# Patient Record
Sex: MALE | Race: WHITE | NOT HISPANIC OR LATINO | Employment: OTHER | URBAN - NONMETROPOLITAN AREA
[De-identification: names, ages, dates, MRNs, and addresses within clinical notes are randomized per-mention and may not be internally consistent; named-entity substitution may affect disease eponyms.]

---

## 2018-08-17 ENCOUNTER — OFFICE VISIT (OUTPATIENT)
Dept: CARDIOLOGY | Facility: CLINIC | Age: 58
End: 2018-08-17

## 2018-08-17 VITALS
WEIGHT: 206 LBS | DIASTOLIC BLOOD PRESSURE: 88 MMHG | OXYGEN SATURATION: 99 % | HEART RATE: 65 BPM | BODY MASS INDEX: 32.33 KG/M2 | SYSTOLIC BLOOD PRESSURE: 140 MMHG | HEIGHT: 67 IN

## 2018-08-17 DIAGNOSIS — I73.9 PVD (PERIPHERAL VASCULAR DISEASE) WITH CLAUDICATION (HCC): ICD-10-CM

## 2018-08-17 DIAGNOSIS — E78.00 PURE HYPERCHOLESTEROLEMIA: ICD-10-CM

## 2018-08-17 DIAGNOSIS — Z01.818 PRE-OPERATIVE CLEARANCE: ICD-10-CM

## 2018-08-17 DIAGNOSIS — F10.10 ETOH ABUSE: ICD-10-CM

## 2018-08-17 DIAGNOSIS — I25.709 CORONARY ARTERY DISEASE INVOLVING CORONARY BYPASS GRAFT OF NATIVE HEART WITH ANGINA PECTORIS (HCC): ICD-10-CM

## 2018-08-17 DIAGNOSIS — Z72.0 NICOTINE ABUSE: ICD-10-CM

## 2018-08-17 DIAGNOSIS — I10 ESSENTIAL HYPERTENSION: Primary | ICD-10-CM

## 2018-08-17 PROCEDURE — 93000 ELECTROCARDIOGRAM COMPLETE: CPT | Performed by: INTERNAL MEDICINE

## 2018-08-17 PROCEDURE — 99204 OFFICE O/P NEW MOD 45 MIN: CPT | Performed by: INTERNAL MEDICINE

## 2018-08-17 RX ORDER — LISINOPRIL 20 MG/1
20 TABLET ORAL NIGHTLY
COMMUNITY
Start: 2018-08-06 | End: 2022-05-09 | Stop reason: SDUPTHER

## 2018-08-17 RX ORDER — CETIRIZINE HYDROCHLORIDE 10 MG/1
10 TABLET ORAL AS NEEDED
COMMUNITY

## 2018-08-17 RX ORDER — METOPROLOL SUCCINATE 50 MG
50 TABLET, EXTENDED RELEASE 24 HR ORAL NIGHTLY
COMMUNITY
Start: 2018-08-06

## 2018-08-17 RX ORDER — ATORVASTATIN CALCIUM 20 MG/1
20 TABLET, FILM COATED ORAL NIGHTLY
COMMUNITY
Start: 2018-08-06 | End: 2022-05-09 | Stop reason: SDUPTHER

## 2018-08-17 RX ORDER — ASPIRIN 81 MG/1
81 TABLET, CHEWABLE ORAL DAILY
COMMUNITY

## 2018-08-17 NOTE — PROGRESS NOTES
[facility]  OFFICE NOTE    Nikhil Hernadez  57 y.o. male    08/17/2018  1. Essential hypertension    2. Pure hypercholesterolemia    3. Pre-operative clearance    4. Nicotine abuse    5. ETOH abuse    6. PVD (peripheral vascular disease) with claudication (CMS/HCC)    7. Coronary artery disease involving coronary bypass graft of native heart with angina pectoris (CMS/HCC)        Chief complaint -preop hip surgery    History of present Illness- 57-year-old gentleman was history of coronary disease status post two-vessel bypass in 1995 at [facility] in the setting of an acute MI has not been followed regularly since 2000.  He is coming for a preop hip surgery.  He smokes about a pack-a-day for many years, and he drinks pretty heavy and even smell of alcohol right now at 8:30 AM.  He has significant claudication of both legs.  He denies any GI symptoms.  He does get short of breath but doesn't complain of any angina pain.  He is on Toprol and lisinopril along with statins.  His labs are being followed by Dr. blair.        Allergies   Allergen Reactions   • Aleve [Naproxen Sodium] Itching, Swelling and Rash   • Morphine And Related Nausea And Vomiting         Past Medical History:   Diagnosis Date   • Gallstones    • Hyperlipidemia    • Hypertension    • Myocardial infarction          Past Surgical History:   Procedure Laterality Date   • ARTERIAL BYPASS SURGERY     • CORONARY ARTERY BYPASS GRAFT           Family History   Problem Relation Age of Onset   • Cancer Mother    • Cancer Father          Social History     Social History   • Marital status:      Spouse name: N/A   • Number of children: N/A   • Years of education: N/A     Occupational History   • Not on file.     Social History Main Topics   • Smoking status: Current Every Day Smoker     Packs/day: 1.00     Types: Cigarettes   • Smokeless tobacco: Never Used   • Alcohol use 9.0 oz/week     15 Shots of liquor per week  "  • Drug use: No   • Sexual activity: Defer     Other Topics Concern   • Not on file     Social History Narrative   • No narrative on file         Current Outpatient Prescriptions   Medication Sig Dispense Refill   • aspirin 81 MG chewable tablet Chew 81 mg Daily.     • atorvastatin (LIPITOR) 20 MG tablet Take 20 mg by mouth Every Night.     • cetirizine (zyrTEC) 10 MG tablet Take 10 mg by mouth Daily.     • lisinopril (PRINIVIL,ZESTRIL) 20 MG tablet Take 20 mg by mouth Daily.     • TOPROL XL 50 MG 24 hr tablet Take 50 mg by mouth Daily.       No current facility-administered medications for this visit.          Review of Systems     Constitution: Denies any fatigue, fever or chills    HENT: Denies any headache, hearing impairment,     Eyes: Denies any blurring of vision, or photophobia     Cardivascular - As per history of present illness     Respiratory system-  COPD,  shortness of breath     Endocrine:   history of hyperlipidemia,                       Musculoskeletal:  Significant arthritis of the hips    Gastrointestinal: No nausea, vomiting, or melena    Genitourinary: No dysuria or hematuria    Neurological:   No history of seizure disorder, stroke, memory problems    Psychiatric/Behavioral:        No history of depression    Hematological- no history of easy bruising or any bleeding diathesis            OBJECTIVE    /88   Pulse 65   Ht 170.2 cm (67\")   Wt 93.4 kg (206 lb)   SpO2 99%   BMI 32.26 kg/m²       Physical Exam     Constitutional: is oriented to person, place, and time.     Skin-warm and dry    Well developed and nourished in no acute distress      Head: Normocephalic and atraumatic.     Eyes: Pupils are equal    Neck: Neck supple. No bruit in the carotids    Cardiovascular: [place] in the fifth intercostal space   Regular rate, and  Rhythm,    S1 greater than S2, no S3 or S4, no gallop     Pulmonary/Chest:   Air  Entry is equal on both sides  Scattered rhonchi at the base on both sides    "   Abdominal: Soft.  No hepatosplenomegaly, bowel sounds are present    Musculoskeletal: No kyphoscoliosis, no significant thickening of the joints    Neurological: is alert and oriented to person, place, and time.    cranial nerve are intact .   No motor or sensory deficit    Extremities-no edema, no radial femoral delay      Psychiatric: He has a normal mood and affect.                  His behavior is normal.             ECG 12 Lead  Date/Time: 8/17/2018 8:35 AM  Performed by: LEVAR JACKSON  Authorized by: LEVAR JACKSON   Comparison: not compared with previous ECG   Rhythm: sinus rhythm  Clinical impression: non-specific ECG  Comments: Sinus rhythm with Q-wave in V1 and V2 possible old septal MI with nonspecific ST-T changes CAD status post CABG              A/P    CAD status post CABG with 2 vessel bypass in 1995 for preop hip surgery with long-standing history of tobacco use and alcohol abuse will do a Lexiscan nuclear stress test rule out ischemia.  Will get an echo to assess LV function and valvular function as he drinks significantly    Peripheral vascular disease with significant claudication will get a EDGARDO as he is going for hip surgery.    Hypertension/hyperlipidemia on atorvastatin and lisinopril with Toprol-XL.    Tobacco abuse/EtOH abuse-needs risk factor modification.    Follow-up in 6-8 weeks            This document has been electronically signed by Levar Jackson MD on August 17, 2018 8:31 AM       EMR Dragon/Transcription disclaimer:   Some of this note may be an electronic transcription/translation of spoken language to printed text. The electronic translation of spoken language may permit erroneous, or at times, nonsensical words or phrases to be inadvertently transcribed; Although I have reviewed the note for such errors, some may still exist.

## 2018-08-27 ENCOUNTER — HOSPITAL ENCOUNTER (OUTPATIENT)
Dept: NUCLEAR MEDICINE | Facility: HOSPITAL | Age: 58
Discharge: HOME | End: 2018-08-27

## 2018-08-27 ENCOUNTER — HOSPITAL ENCOUNTER (OUTPATIENT)
Dept: CARDIOLOGY | Facility: HOSPITAL | Age: 58
Discharge: HOME | End: 2018-08-27

## 2018-08-27 LAB
BH CV STRESS BP STAGE 1: NORMAL
BH CV STRESS COMMENTS STAGE 1: NORMAL
BH CV STRESS DOSE REGADENOSON STAGE 1: 0.4
BH CV STRESS DURATION MIN STAGE 1: 0
BH CV STRESS DURATION SEC STAGE 1: 10
BH CV STRESS HR STAGE 1: 50
BH CV STRESS PROTOCOL 1: NORMAL
BH CV STRESS RECOVERY BP: NORMAL MMHG
BH CV STRESS RECOVERY HR: 68 BPM
BH CV STRESS STAGE 1: 1
LV EF NUC BP: 55 %
MAXIMAL PREDICTED HEART RATE: 161 BPM
PERCENT MAX PREDICTED HR: 51.55 %
STRESS BASELINE BP: NORMAL MMHG
STRESS BASELINE HR: 51 BPM
STRESS PERCENT HR: 61 %
STRESS POST ESTIMATED WORKLOAD: 1 METS
STRESS POST PEAK BP: NORMAL MMHG
STRESS POST PEAK HR: 83 BPM
STRESS TARGET HR: 137 BPM

## 2018-08-27 PROCEDURE — A9500 TC99M SESTAMIBI: HCPCS | Performed by: INTERNAL MEDICINE

## 2018-08-27 PROCEDURE — 78452 HT MUSCLE IMAGE SPECT MULT: CPT

## 2018-08-27 PROCEDURE — 0 TECHNETIUM SESTAMIBI: Performed by: INTERNAL MEDICINE

## 2018-08-27 PROCEDURE — 93017 CV STRESS TEST TRACING ONLY: CPT

## 2018-08-27 PROCEDURE — 93016 CV STRESS TEST SUPVJ ONLY: CPT | Performed by: INTERNAL MEDICINE

## 2018-08-27 PROCEDURE — 93018 CV STRESS TEST I&R ONLY: CPT | Performed by: INTERNAL MEDICINE

## 2018-08-27 PROCEDURE — 25010000002 REGADENOSON 0.4 MG/5ML SOLUTION: Performed by: INTERNAL MEDICINE

## 2018-08-27 PROCEDURE — 78452 HT MUSCLE IMAGE SPECT MULT: CPT | Performed by: INTERNAL MEDICINE

## 2018-08-27 RX ORDER — 0.9 % SODIUM CHLORIDE 0.9 %
10 VIAL (ML) INJECTION AS NEEDED
Status: DISCONTINUED | OUTPATIENT
Start: 2018-08-27 | End: 2018-08-28 | Stop reason: HOSPADM

## 2018-08-27 RX ADMIN — REGADENOSON 0.4 MG: 0.08 INJECTION, SOLUTION INTRAVENOUS at 08:41

## 2018-08-27 RX ADMIN — TECHNETIUM TC 99M SESTAMIBI 1 DOSE: 1 INJECTION INTRAVENOUS at 07:07

## 2018-08-27 RX ADMIN — SODIUM CHLORIDE, PRESERVATIVE FREE 10 ML: 5 INJECTION INTRAVENOUS at 08:41

## 2018-08-27 RX ADMIN — TECHNETIUM TC 99M SESTAMIBI 1 DOSE: 1 INJECTION INTRAVENOUS at 08:42

## 2018-09-05 ENCOUNTER — DOCUMENTATION (OUTPATIENT)
Dept: CARDIOLOGY | Facility: CLINIC | Age: 58
End: 2018-09-05

## 2018-09-06 ENCOUNTER — PREP FOR SURGERY (OUTPATIENT)
Dept: OTHER | Facility: HOSPITAL | Age: 58
End: 2018-09-06

## 2018-09-06 DIAGNOSIS — I73.9 PVD (PERIPHERAL VASCULAR DISEASE) WITH CLAUDICATION (HCC): Primary | ICD-10-CM

## 2018-09-06 DIAGNOSIS — R68.89 ABNORMAL ANKLE BRACHIAL INDEX (ABI): Primary | ICD-10-CM

## 2018-09-06 LAB
BH CV ECHO MEAS - ACS: 1.9 CM
BH CV ECHO MEAS - AO MAX PG (FULL): 0.11 MMHG
BH CV ECHO MEAS - AO MAX PG: 7.8 MMHG
BH CV ECHO MEAS - AO MEAN PG (FULL): 1 MMHG
BH CV ECHO MEAS - AO MEAN PG: 4 MMHG
BH CV ECHO MEAS - AO ROOT AREA (BSA CORRECTED): 1.6
BH CV ECHO MEAS - AO ROOT AREA: 8 CM^2
BH CV ECHO MEAS - AO ROOT DIAM: 3.2 CM
BH CV ECHO MEAS - AO V2 MAX: 140 CM/SEC
BH CV ECHO MEAS - AO V2 MEAN: 96 CM/SEC
BH CV ECHO MEAS - AO V2 VTI: 30.3 CM
BH CV ECHO MEAS - ASC AORTA: 3.3 CM
BH CV ECHO MEAS - AVA(I,A): 4.2 CM^2
BH CV ECHO MEAS - AVA(I,D): 4.2 CM^2
BH CV ECHO MEAS - AVA(V,A): 4.5 CM^2
BH CV ECHO MEAS - AVA(V,D): 4.5 CM^2
BH CV ECHO MEAS - BSA(HAYCOCK): 2.1 M^2
BH CV ECHO MEAS - BSA: 2 M^2
BH CV ECHO MEAS - BZI_BMI: 32.3 KILOGRAMS/M^2
BH CV ECHO MEAS - BZI_METRIC_HEIGHT: 170.2 CM
BH CV ECHO MEAS - BZI_METRIC_WEIGHT: 93.4 KG
BH CV ECHO MEAS - EDV(CUBED): 175.6 ML
BH CV ECHO MEAS - EDV(MOD-SP4): 149 ML
BH CV ECHO MEAS - EDV(TEICH): 153.7 ML
BH CV ECHO MEAS - EF(CUBED): 44.6 %
BH CV ECHO MEAS - EF(MOD-SP4): 48.3 %
BH CV ECHO MEAS - EF(TEICH): 36.7 %
BH CV ECHO MEAS - ESV(CUBED): 97.3 ML
BH CV ECHO MEAS - ESV(MOD-SP4): 77 ML
BH CV ECHO MEAS - ESV(TEICH): 97.3 ML
BH CV ECHO MEAS - FS: 17.9 %
BH CV ECHO MEAS - IVS/LVPW: 0.91
BH CV ECHO MEAS - IVSD: 1 CM
BH CV ECHO MEAS - LA DIMENSION: 4.5 CM
BH CV ECHO MEAS - LA/AO: 1.4
BH CV ECHO MEAS - LV DIASTOLIC VOL/BSA (35-75): 72.8 ML/M^2
BH CV ECHO MEAS - LV MASS(C)D: 234.3 GRAMS
BH CV ECHO MEAS - LV MASS(C)DI: 114.4 GRAMS/M^2
BH CV ECHO MEAS - LV MAX PG: 7.7 MMHG
BH CV ECHO MEAS - LV MEAN PG: 3 MMHG
BH CV ECHO MEAS - LV SYSTOLIC VOL/BSA (12-30): 37.6 ML/M^2
BH CV ECHO MEAS - LV V1 MAX: 139 CM/SEC
BH CV ECHO MEAS - LV V1 MEAN: 85 CM/SEC
BH CV ECHO MEAS - LV V1 VTI: 28.3 CM
BH CV ECHO MEAS - LVIDD: 5.6 CM
BH CV ECHO MEAS - LVIDS: 4.6 CM
BH CV ECHO MEAS - LVLD AP4: 8.9 CM
BH CV ECHO MEAS - LVLS AP4: 7.3 CM
BH CV ECHO MEAS - LVOT AREA (M): 4.5 CM^2
BH CV ECHO MEAS - LVOT AREA: 4.5 CM^2
BH CV ECHO MEAS - LVOT DIAM: 2.4 CM
BH CV ECHO MEAS - LVPWD: 1.1 CM
BH CV ECHO MEAS - MV A MAX VEL: 44.6 CM/SEC
BH CV ECHO MEAS - MV DEC SLOPE: 436 CM/SEC^2
BH CV ECHO MEAS - MV E MAX VEL: 61 CM/SEC
BH CV ECHO MEAS - MV E/A: 1.4
BH CV ECHO MEAS - MV P1/2T MAX VEL: 94.1 CM/SEC
BH CV ECHO MEAS - MV P1/2T: 63.2 MSEC
BH CV ECHO MEAS - MVA P1/2T LCG: 2.3 CM^2
BH CV ECHO MEAS - MVA(P1/2T): 3.5 CM^2
BH CV ECHO MEAS - PA MAX PG: 1.2 MMHG
BH CV ECHO MEAS - PA V2 MAX: 53.8 CM/SEC
BH CV ECHO MEAS - RAP SYSTOLE: 5 MMHG
BH CV ECHO MEAS - RVDD: 3.4 CM
BH CV ECHO MEAS - RVSP: 16.6 MMHG
BH CV ECHO MEAS - SI(AO): 119 ML/M^2
BH CV ECHO MEAS - SI(CUBED): 38.2 ML/M^2
BH CV ECHO MEAS - SI(LVOT): 62.5 ML/M^2
BH CV ECHO MEAS - SI(MOD-SP4): 35.2 ML/M^2
BH CV ECHO MEAS - SI(TEICH): 27.5 ML/M^2
BH CV ECHO MEAS - SV(AO): 243.7 ML
BH CV ECHO MEAS - SV(CUBED): 78.3 ML
BH CV ECHO MEAS - SV(LVOT): 128 ML
BH CV ECHO MEAS - SV(MOD-SP4): 72 ML
BH CV ECHO MEAS - SV(TEICH): 56.3 ML
BH CV ECHO MEAS - TR MAX VEL: 170 CM/SEC
BH CV LOWER ARTERIAL LEFT ABI RATIO: 0.62
BH CV LOWER ARTERIAL LEFT DORSALIS PEDIS SYS MAX: 106 MMHG
BH CV LOWER ARTERIAL LEFT LOW THIGH SYS MAX: 121 MMHG
BH CV LOWER ARTERIAL LEFT POPLITEAL SYS MAX: 123 MMHG
BH CV LOWER ARTERIAL LEFT POST TIBIAL SYS MAX: 106 MMHG
BH CV LOWER ARTERIAL RIGHT ABI RATIO: 1.14
BH CV LOWER ARTERIAL RIGHT DORSALIS PEDIS SYS MAX: 195 MMHG
BH CV LOWER ARTERIAL RIGHT LOW THIGH SYS MAX: 200 MMHG
BH CV LOWER ARTERIAL RIGHT POPLITEAL SYS MAX: 255 MMHG
BH CV LOWER ARTERIAL RIGHT POST TIBIAL SYS MAX: 188 MMHG
LV EF 2D ECHO EST: 51 %
MAXIMAL PREDICTED HEART RATE: 161 BPM
STRESS TARGET HR: 137 BPM
UPPER ARTERIAL LEFT ARM BRACHIAL SYS MAX: 165 MMHG
UPPER ARTERIAL RIGHT ARM BRACHIAL SYS MAX: 171 MMHG

## 2018-09-07 DIAGNOSIS — Z79.899 ON LONG TERM DRUG THERAPY: Primary | ICD-10-CM

## 2018-09-11 ENCOUNTER — LAB (OUTPATIENT)
Dept: LAB | Facility: HOSPITAL | Age: 58
End: 2018-09-11

## 2018-09-11 DIAGNOSIS — Z79.899 ON LONG TERM DRUG THERAPY: ICD-10-CM

## 2018-09-11 LAB
ALBUMIN SERPL-MCNC: 3.9 G/DL (ref 3.4–4.8)
ALBUMIN/GLOB SERPL: 1.2 G/DL (ref 1.1–1.8)
ALP SERPL-CCNC: 66 U/L (ref 38–126)
ALT SERPL W P-5'-P-CCNC: 62 U/L (ref 21–72)
ANION GAP SERPL CALCULATED.3IONS-SCNC: 9 MMOL/L (ref 5–15)
AST SERPL-CCNC: 52 U/L (ref 17–59)
BILIRUB SERPL-MCNC: 0.7 MG/DL (ref 0.2–1.3)
BUN BLD-MCNC: 9 MG/DL (ref 7–21)
BUN/CREAT SERPL: 13.8 (ref 7–25)
CALCIUM SPEC-SCNC: 9.1 MG/DL (ref 8.4–10.2)
CHLORIDE SERPL-SCNC: 103 MMOL/L (ref 95–110)
CO2 SERPL-SCNC: 25 MMOL/L (ref 22–31)
CREAT BLD-MCNC: 0.65 MG/DL (ref 0.7–1.3)
GFR SERPL CREATININE-BSD FRML MDRD: 126 ML/MIN/1.73 (ref 56–130)
GLOBULIN UR ELPH-MCNC: 3.3 GM/DL (ref 2.3–3.5)
GLUCOSE BLD-MCNC: 110 MG/DL (ref 60–100)
POTASSIUM BLD-SCNC: 4.2 MMOL/L (ref 3.5–5.1)
PROT SERPL-MCNC: 7.2 G/DL (ref 6.3–8.6)
SODIUM BLD-SCNC: 137 MMOL/L (ref 137–145)

## 2018-09-11 PROCEDURE — 80053 COMPREHEN METABOLIC PANEL: CPT

## 2018-09-14 ENCOUNTER — HOSPITAL ENCOUNTER (OUTPATIENT)
Dept: CT IMAGING | Facility: HOSPITAL | Age: 58
Discharge: HOME | End: 2018-09-14
Admitting: INTERNAL MEDICINE

## 2018-09-14 DIAGNOSIS — R68.89 ABNORMAL ANKLE BRACHIAL INDEX (ABI): ICD-10-CM

## 2018-09-14 PROCEDURE — 75635 CT ANGIO ABDOMINAL ARTERIES: CPT

## 2018-09-14 PROCEDURE — 0 IOPAMIDOL PER 1 ML: Performed by: INTERNAL MEDICINE

## 2018-09-14 RX ADMIN — IOPAMIDOL 94 ML: 755 INJECTION, SOLUTION INTRAVENOUS at 15:23

## 2018-09-18 ENCOUNTER — DOCUMENTATION (OUTPATIENT)
Dept: CARDIOLOGY | Facility: CLINIC | Age: 58
End: 2018-09-18

## 2018-09-18 RX ORDER — SODIUM CHLORIDE 0.9 % (FLUSH) 0.9 %
1-10 SYRINGE (ML) INJECTION AS NEEDED
Status: CANCELLED | OUTPATIENT
Start: 2018-10-02

## 2018-09-18 RX ORDER — SODIUM CHLORIDE 9 MG/ML
80 INJECTION, SOLUTION INTRAVENOUS CONTINUOUS
Status: CANCELLED | OUTPATIENT
Start: 2018-10-02

## 2018-09-19 PROBLEM — I73.9 PVD (PERIPHERAL VASCULAR DISEASE) WITH CLAUDICATION (HCC): Status: ACTIVE | Noted: 2018-09-19

## 2018-10-02 ENCOUNTER — HOSPITAL ENCOUNTER (OUTPATIENT)
Facility: HOSPITAL | Age: 58
Discharge: HOME | End: 2018-10-03
Attending: INTERNAL MEDICINE | Admitting: INTERNAL MEDICINE

## 2018-10-02 DIAGNOSIS — I73.9 PVD (PERIPHERAL VASCULAR DISEASE) WITH CLAUDICATION (HCC): ICD-10-CM

## 2018-10-02 LAB
ANION GAP SERPL CALCULATED.3IONS-SCNC: 11 MMOL/L (ref 5–15)
BUN BLD-MCNC: 6 MG/DL (ref 7–21)
BUN/CREAT SERPL: 9.7 (ref 7–25)
CALCIUM SPEC-SCNC: 8.7 MG/DL (ref 8.4–10.2)
CHLORIDE SERPL-SCNC: 105 MMOL/L (ref 95–110)
CO2 SERPL-SCNC: 24 MMOL/L (ref 22–31)
CREAT BLD-MCNC: 0.62 MG/DL (ref 0.7–1.3)
DEPRECATED RDW RBC AUTO: 45.2 FL (ref 35.1–43.9)
ERYTHROCYTE [DISTWIDTH] IN BLOOD BY AUTOMATED COUNT: 13.6 % (ref 11.5–14.5)
GFR SERPL CREATININE-BSD FRML MDRD: 133 ML/MIN/1.73 (ref 56–130)
GLUCOSE BLD-MCNC: 114 MG/DL (ref 60–100)
HCT VFR BLD AUTO: 43 % (ref 39–49)
HGB BLD-MCNC: 14.8 G/DL (ref 13.7–17.3)
INR PPP: 1.04 (ref 0.8–1.2)
MCH RBC QN AUTO: 31.1 PG (ref 26.5–34)
MCHC RBC AUTO-ENTMCNC: 34.4 G/DL (ref 31.5–36.3)
MCV RBC AUTO: 90.3 FL (ref 80–98)
PLATELET # BLD AUTO: 163 10*3/MM3 (ref 150–450)
PMV BLD AUTO: 10.8 FL (ref 8–12)
POTASSIUM BLD-SCNC: 3.7 MMOL/L (ref 3.5–5.1)
PROTHROMBIN TIME: 13.4 SECONDS (ref 11.1–15.3)
RBC # BLD AUTO: 4.76 10*6/MM3 (ref 4.37–5.74)
SODIUM BLD-SCNC: 140 MMOL/L (ref 137–145)
WBC NRBC COR # BLD: 5.28 10*3/MM3 (ref 3.2–9.8)

## 2018-10-02 PROCEDURE — C1894 INTRO/SHEATH, NON-LASER: HCPCS | Performed by: INTERNAL MEDICINE

## 2018-10-02 PROCEDURE — C1769 GUIDE WIRE: HCPCS | Performed by: INTERNAL MEDICINE

## 2018-10-02 PROCEDURE — C1887 CATHETER, GUIDING: HCPCS | Performed by: INTERNAL MEDICINE

## 2018-10-02 PROCEDURE — 25010000002 HEPARIN (PORCINE) PER 1000 UNITS: Performed by: INTERNAL MEDICINE

## 2018-10-02 PROCEDURE — C1724 CATH, TRANS ATHEREC,ROTATION: HCPCS | Performed by: INTERNAL MEDICINE

## 2018-10-02 PROCEDURE — 37225 PR REVSC OPN/PRQ FEM/POP W/ATHRC/ANGIOP SM VSL: CPT | Performed by: INTERNAL MEDICINE

## 2018-10-02 PROCEDURE — 80048 BASIC METABOLIC PNL TOTAL CA: CPT | Performed by: INTERNAL MEDICINE

## 2018-10-02 PROCEDURE — 85027 COMPLETE CBC AUTOMATED: CPT | Performed by: INTERNAL MEDICINE

## 2018-10-02 PROCEDURE — C1725 CATH, TRANSLUMIN NON-LASER: HCPCS | Performed by: INTERNAL MEDICINE

## 2018-10-02 PROCEDURE — C2623 CATH, TRANSLUMIN, DRUG-COAT: HCPCS | Performed by: INTERNAL MEDICINE

## 2018-10-02 PROCEDURE — 0 IOPAMIDOL PER 1 ML: Performed by: INTERNAL MEDICINE

## 2018-10-02 PROCEDURE — 94760 N-INVAS EAR/PLS OXIMETRY 1: CPT

## 2018-10-02 PROCEDURE — 94799 UNLISTED PULMONARY SVC/PX: CPT

## 2018-10-02 PROCEDURE — 85610 PROTHROMBIN TIME: CPT | Performed by: INTERNAL MEDICINE

## 2018-10-02 PROCEDURE — 75716 ARTERY X-RAYS ARMS/LEGS: CPT | Performed by: INTERNAL MEDICINE

## 2018-10-02 PROCEDURE — 25010000002 MIDAZOLAM PER 1 MG: Performed by: INTERNAL MEDICINE

## 2018-10-02 PROCEDURE — 25010000002 PROTAMINE SULFATE PER 10 MG: Performed by: INTERNAL MEDICINE

## 2018-10-02 PROCEDURE — 25010000002 FENTANYL CITRATE (PF) 100 MCG/2ML SOLUTION: Performed by: INTERNAL MEDICINE

## 2018-10-02 PROCEDURE — 25010000002 ONDANSETRON PER 1 MG: Performed by: INTERNAL MEDICINE

## 2018-10-02 RX ORDER — ASPIRIN 81 MG/1
81 TABLET, CHEWABLE ORAL DAILY
Status: DISCONTINUED | OUTPATIENT
Start: 2018-10-02 | End: 2018-10-03 | Stop reason: HOSPADM

## 2018-10-02 RX ORDER — SODIUM CHLORIDE 9 MG/ML
80 INJECTION, SOLUTION INTRAVENOUS CONTINUOUS
Status: DISCONTINUED | OUTPATIENT
Start: 2018-10-02 | End: 2018-10-02

## 2018-10-02 RX ORDER — HEPARIN SODIUM 1000 [USP'U]/ML
INJECTION, SOLUTION INTRAVENOUS; SUBCUTANEOUS AS NEEDED
Status: DISCONTINUED | OUTPATIENT
Start: 2018-10-02 | End: 2018-10-02 | Stop reason: HOSPADM

## 2018-10-02 RX ORDER — ASPIRIN 81 MG/1
81 TABLET, CHEWABLE ORAL DAILY
Status: DISCONTINUED | OUTPATIENT
Start: 2018-10-02 | End: 2018-10-02

## 2018-10-02 RX ORDER — HYDROCODONE BITARTRATE AND ACETAMINOPHEN 10; 325 MG/1; MG/1
1 TABLET ORAL ONCE AS NEEDED
Status: COMPLETED | OUTPATIENT
Start: 2018-10-02 | End: 2018-10-02

## 2018-10-02 RX ORDER — PROTAMINE SULFATE 10 MG/ML
INJECTION, SOLUTION INTRAVENOUS AS NEEDED
Status: DISCONTINUED | OUTPATIENT
Start: 2018-10-02 | End: 2018-10-02 | Stop reason: HOSPADM

## 2018-10-02 RX ORDER — LISINOPRIL 20 MG/1
20 TABLET ORAL NIGHTLY
Status: DISCONTINUED | OUTPATIENT
Start: 2018-10-02 | End: 2018-10-03 | Stop reason: HOSPADM

## 2018-10-02 RX ORDER — ONDANSETRON 2 MG/ML
4 INJECTION INTRAMUSCULAR; INTRAVENOUS EVERY 6 HOURS PRN
Status: DISCONTINUED | OUTPATIENT
Start: 2018-10-02 | End: 2018-10-03 | Stop reason: HOSPADM

## 2018-10-02 RX ORDER — ONDANSETRON 4 MG/1
4 TABLET, FILM COATED ORAL EVERY 6 HOURS PRN
Status: DISCONTINUED | OUTPATIENT
Start: 2018-10-02 | End: 2018-10-03 | Stop reason: HOSPADM

## 2018-10-02 RX ORDER — CLOPIDOGREL BISULFATE 75 MG/1
75 TABLET ORAL DAILY
Status: DISCONTINUED | OUTPATIENT
Start: 2018-10-03 | End: 2018-10-03 | Stop reason: HOSPADM

## 2018-10-02 RX ORDER — METOPROLOL SUCCINATE 50 MG/1
50 TABLET, EXTENDED RELEASE ORAL NIGHTLY
Status: DISCONTINUED | OUTPATIENT
Start: 2018-10-02 | End: 2018-10-03 | Stop reason: HOSPADM

## 2018-10-02 RX ORDER — LORAZEPAM 0.5 MG/1
1 TABLET ORAL EVERY 6 HOURS
Status: DISCONTINUED | OUTPATIENT
Start: 2018-10-02 | End: 2018-10-03 | Stop reason: HOSPADM

## 2018-10-02 RX ORDER — ONDANSETRON 2 MG/ML
4 INJECTION INTRAMUSCULAR; INTRAVENOUS EVERY 6 HOURS PRN
Status: DISCONTINUED | OUTPATIENT
Start: 2018-10-02 | End: 2018-10-02 | Stop reason: SDUPTHER

## 2018-10-02 RX ORDER — SODIUM CHLORIDE 0.9 % (FLUSH) 0.9 %
1-10 SYRINGE (ML) INJECTION AS NEEDED
Status: DISCONTINUED | OUTPATIENT
Start: 2018-10-02 | End: 2018-10-02 | Stop reason: HOSPADM

## 2018-10-02 RX ORDER — CLOPIDOGREL BISULFATE 75 MG/1
TABLET ORAL AS NEEDED
Status: DISCONTINUED | OUTPATIENT
Start: 2018-10-02 | End: 2018-10-02 | Stop reason: HOSPADM

## 2018-10-02 RX ORDER — MIDAZOLAM HYDROCHLORIDE 1 MG/ML
INJECTION INTRAMUSCULAR; INTRAVENOUS AS NEEDED
Status: DISCONTINUED | OUTPATIENT
Start: 2018-10-02 | End: 2018-10-02 | Stop reason: HOSPADM

## 2018-10-02 RX ORDER — LIDOCAINE HYDROCHLORIDE 20 MG/ML
INJECTION, SOLUTION INFILTRATION; PERINEURAL AS NEEDED
Status: DISCONTINUED | OUTPATIENT
Start: 2018-10-02 | End: 2018-10-02 | Stop reason: HOSPADM

## 2018-10-02 RX ORDER — ATORVASTATIN CALCIUM 20 MG/1
20 TABLET, FILM COATED ORAL NIGHTLY
Status: DISCONTINUED | OUTPATIENT
Start: 2018-10-02 | End: 2018-10-03 | Stop reason: HOSPADM

## 2018-10-02 RX ORDER — FENTANYL CITRATE 50 UG/ML
INJECTION, SOLUTION INTRAMUSCULAR; INTRAVENOUS AS NEEDED
Status: DISCONTINUED | OUTPATIENT
Start: 2018-10-02 | End: 2018-10-02 | Stop reason: HOSPADM

## 2018-10-02 RX ORDER — SODIUM CHLORIDE 9 MG/ML
100 INJECTION, SOLUTION INTRAVENOUS CONTINUOUS
Status: DISCONTINUED | OUTPATIENT
Start: 2018-10-02 | End: 2018-10-03 | Stop reason: HOSPADM

## 2018-10-02 RX ORDER — ONDANSETRON 4 MG/1
4 TABLET, ORALLY DISINTEGRATING ORAL EVERY 6 HOURS PRN
Status: DISCONTINUED | OUTPATIENT
Start: 2018-10-02 | End: 2018-10-03 | Stop reason: HOSPADM

## 2018-10-02 RX ORDER — LORAZEPAM 0.5 MG/1
1 TABLET ORAL EVERY 8 HOURS
Status: DISCONTINUED | OUTPATIENT
Start: 2018-10-03 | End: 2018-10-03 | Stop reason: HOSPADM

## 2018-10-02 RX ADMIN — METOPROLOL SUCCINATE 50 MG: 50 TABLET, EXTENDED RELEASE ORAL at 20:25

## 2018-10-02 RX ADMIN — HYDROCODONE BITARTRATE AND ACETAMINOPHEN 1 TABLET: 10; 325 TABLET ORAL at 12:48

## 2018-10-02 RX ADMIN — ATORVASTATIN CALCIUM 20 MG: 20 TABLET, FILM COATED ORAL at 20:26

## 2018-10-02 RX ADMIN — LORAZEPAM 1 MG: 0.5 TABLET ORAL at 21:01

## 2018-10-02 RX ADMIN — SODIUM CHLORIDE 100 ML/HR: 9 INJECTION, SOLUTION INTRAVENOUS at 11:50

## 2018-10-02 RX ADMIN — SODIUM CHLORIDE 80 ML/HR: 9 INJECTION, SOLUTION INTRAVENOUS at 06:53

## 2018-10-02 RX ADMIN — LISINOPRIL 20 MG: 20 TABLET ORAL at 20:26

## 2018-10-02 RX ADMIN — ONDANSETRON HYDROCHLORIDE 4 MG: 2 INJECTION INTRAMUSCULAR; INTRAVENOUS at 12:48

## 2018-10-02 RX ADMIN — ASPIRIN 81 MG CHEWABLE TABLET 81 MG: 81 TABLET CHEWABLE at 12:48

## 2018-10-02 NOTE — PLAN OF CARE
Problem: Patient Care Overview  Goal: Plan of Care Review  Outcome: Ongoing (interventions implemented as appropriate)   10/02/18 7090   Coping/Psychosocial   Plan of Care Reviewed With patient   Plan of Care Review   Progress improving   OTHER   Outcome Summary no groin site complications/vss/ pain better after bedrest completed     Goal: Individualization and Mutuality  Outcome: Ongoing (interventions implemented as appropriate)    Goal: Discharge Needs Assessment  Outcome: Ongoing (interventions implemented as appropriate)      Problem: Pain, Acute (Adult)  Goal: Identify Related Risk Factors and Signs and Symptoms  Outcome: Ongoing (interventions implemented as appropriate)    Goal: Acceptable Pain Control/Comfort Level  Outcome: Ongoing (interventions implemented as appropriate)      Problem: Cardiac Catheterization (Diagnostic/Interventional) (Adult)  Goal: Signs and Symptoms of Listed Potential Problems Will be Absent, Minimized or Managed (Cardiac Catheterization)  Outcome: Ongoing (interventions implemented as appropriate)    Goal: Anesthesia/Sedation Recovery  Outcome: Outcome(s) achieved Date Met: 10/02/18      Problem: Cardiac: ACS (Acute Coronary Syndrome) (Adult)  Goal: Signs and Symptoms of Listed Potential Problems Will be Absent, Minimized or Managed (Cardiac: ACS)  Outcome: Ongoing (interventions implemented as appropriate)

## 2018-10-02 NOTE — H&P
[facility]  HISTORY AND PHYSICAL  Nikhil Hernadez  57 y.o. male    Chief complaint -significant claudication of left leg      History of present Lwisrqx-31-wy[age]-old gentleman with history of coronary disease status post two-vessel bypass in 1995 and [place] has been a heavy smoker and drinks significant alcohol has been having claudication pain on the left leg with difficulty getting around and had an EDGARDO which showed significant abnormality on the left side and subsequent CTA showed 2 high-grade stenosis on the left SFA and was brought for elective PTA to the left leg.              Allergies   Allergen Reactions   • Aleve [Naproxen Sodium] Itching, Swelling and Rash   • Morphine And Related Nausea And Vomiting         Past Medical History:   Diagnosis Date   • Gallstones    • Hyperlipidemia    • Hypertension    • Myocardial infarction (CMS/HCC)          Past Surgical History:   Procedure Laterality Date   • ARTERIAL BYPASS SURGERY     • CORONARY ARTERY BYPASS GRAFT           Family History   Problem Relation Age of Onset   • Cancer Mother    • Cancer Father          Social History     Social History   • Marital status:      Spouse name: N/A   • Number of children: N/A   • Years of education: N/A     Occupational History   • Not on file.     Social History Main Topics   • Smoking status: Current Every Day Smoker     Packs/day: 1.00     Types: Cigarettes   • Smokeless tobacco: Never Used   • Alcohol use 9.0 oz/week     15 Shots of liquor per week   • Drug use: No   • Sexual activity: Defer     Other Topics Concern   • Not on file     Social History Narrative   • No narrative on file         Current Facility-Administered Medications   Medication Dose Route Frequency Provider Last Rate Last Dose   • sodium chloride 0.9 % flush 1-10 mL  1-10 mL Intravenous PRN Dave Garza MD       • sodium chloride 0.9 % infusion  80 mL/hr Intravenous Continuous Dave Garza MD 80  "mL/hr at 10/02/18 0653 80 mL/hr at 10/02/18 0653         Review of Systems     Constitution: Denies any fatigue, fever or chills    HENT: Denies any headache, hearing impairment    Eyes: Denies any blurring of vision, or photophobia     Cardivascular - As per history of present illness     Respiratory system-denies any COPD, shortness of breath     Endocrine:   history of hyperlipidemia       Musculoskeletal: Significant claudication pain on the left leg    Gastrointestinal: No nausea, vomiting, or melena    Genitourinary: No dysuria or hematuria    Neurological:   No history of seizure disorder, stroke, memory problems    Psychiatric/Behavioral:      EtOH abuse and tobacco abuse    Hematological- no history of easy bruising            OBJECTIVE    /86 (BP Location: Right arm, Patient Position: Lying)   Pulse 66   Temp 98.3 °F (36.8 °C) (Temporal Artery )   Resp 20   Ht 170.2 cm (67\")   Wt 97.6 kg (215 lb 2.7 oz)   SpO2 100%   BMI 33.70 kg/m²       Physical Exam     Constitutional: is oriented to person, place, and time.     Skin-warm and dry    Well developed and nourished in no acute distress      Head: Normocephalic and atraumatic.     Eyes: Pupils are equal    Neck: Neck supple. No bruit in the carotids,    Cardiovascular: [place] in the fifth intercostal space, regular rate, and  Rhythm,  S1 greater than S2, no S3 or S4, no gallop       Pulmonary/Chest:   Air  Entry is equal on both sides  No wheezing or crackles,      Abdominal: Soft.  No hepatosplenomegaly, bowel sounds are present    Musculoskeletal: No kyphoscoliosis    Neurological: is alert and oriented to person, place, and time.    cranial nerve are intact .   No motor or sensory deficit    Extremities-no edema, pulses weak on the left popliteal and below      Psychiatric: He has a normal mood and affect.                  His behavior is normal.        Lab Results (last 24 hours)     Procedure Component Value Units Date/Time    Basic Metabolic " Panel [[id number]]  (Abnormal) Collected:  10/02/18 0649    Specimen:  Blood Updated:  10/02/18 0711     Glucose 114 (H) mg/dL      BUN 6 (L) mg/dL      Creatinine 0.62 (L) mg/dL      Sodium 140 mmol/L      Potassium 3.7 mmol/L      Chloride 105 mmol/L      CO2 24.0 mmol/L      Calcium 8.7 mg/dL      eGFR Non African Amer 133 (H) mL/min/1.73      BUN/Creatinine Ratio 9.7     Anion Gap 11.0 mmol/L     CBC (No Diff) [[id number]]  (Abnormal) Collected:  10/02/18 0649    Specimen:  Blood Updated:  10/02/18 0704     WBC 5.28 10*3/mm3      RBC 4.76 10*6/mm3      Hemoglobin 14.8 g/dL      Hematocrit 43.0 %      MCV 90.3 fL      MCH 31.1 pg      MCHC 34.4 g/dL      RDW 13.6 %      RDW-SD 45.2 (H) fl      MPV 10.8 fL      Platelets 163 10*3/mm3     Protime-INR [[id number]]  (Normal) Collected:  10/02/18 0649    Specimen:  Blood Updated:  10/02/18 0709     Protime 13.4 Seconds      INR 1.04    Narrative:       Therapeutic range for most indications is 2.0-3.0 INR,  or 2.5-3.5 for mechanical heart valves.                 A/P  Abnormal EDGARDO with abnormal CTA of the left SFA, for possible PTA with atherectomy and stent placement if necessary.  Expel all the risks and benefits not limited to amputation of the leg, bleeding, emergency surgery and patient consented.  Patient is ASA class II, Mallampatti level II.  He consented for conscious sedation    Hyperlipidemia will continue statins.  Hypertension on lisinopril    EtOH abuse and tobacco abuse needs his factor modification        Dave Garza MD  10/2/2018  8:04 AM    EMR Dragon/Transcription disclaimer:   Some of this note may be an electronic transcription/translation of spoken language to printed text. The electronic translation of spoken language may permit erroneous, or at times, nonsensical words or phrases to be inadvertently transcribed; Although I have reviewed the note for such errors, some may still exist.

## 2018-10-03 VITALS
HEIGHT: 67 IN | RESPIRATION RATE: 18 BRPM | BODY MASS INDEX: 33.97 KG/M2 | HEART RATE: 60 BPM | WEIGHT: 216.4 LBS | TEMPERATURE: 97.6 F | SYSTOLIC BLOOD PRESSURE: 159 MMHG | OXYGEN SATURATION: 94 % | DIASTOLIC BLOOD PRESSURE: 74 MMHG

## 2018-10-03 LAB
ANION GAP SERPL CALCULATED.3IONS-SCNC: 7 MMOL/L (ref 5–15)
ARTICHOKE IGE QN: 42 MG/DL (ref 1–129)
BUN BLD-MCNC: 8 MG/DL (ref 7–21)
BUN/CREAT SERPL: 11.4 (ref 7–25)
CALCIUM SPEC-SCNC: 8.2 MG/DL (ref 8.4–10.2)
CHLORIDE SERPL-SCNC: 105 MMOL/L (ref 95–110)
CHOLEST SERPL-MCNC: 102 MG/DL (ref 0–199)
CO2 SERPL-SCNC: 25 MMOL/L (ref 22–31)
CREAT BLD-MCNC: 0.7 MG/DL (ref 0.7–1.3)
DEPRECATED RDW RBC AUTO: 45.9 FL (ref 35.1–43.9)
ERYTHROCYTE [DISTWIDTH] IN BLOOD BY AUTOMATED COUNT: 13.8 % (ref 11.5–14.5)
GFR SERPL CREATININE-BSD FRML MDRD: 115 ML/MIN/1.73 (ref 56–130)
GLUCOSE BLD-MCNC: 94 MG/DL (ref 60–100)
HCT VFR BLD AUTO: 38.8 % (ref 39–49)
HDLC SERPL-MCNC: 38 MG/DL (ref 60–200)
HGB BLD-MCNC: 13.3 G/DL (ref 13.7–17.3)
LDLC/HDLC SERPL: 1.37 {RATIO} (ref 0–3.55)
MCH RBC QN AUTO: 31.3 PG (ref 26.5–34)
MCHC RBC AUTO-ENTMCNC: 34.3 G/DL (ref 31.5–36.3)
MCV RBC AUTO: 91.3 FL (ref 80–98)
PLATELET # BLD AUTO: 116 10*3/MM3 (ref 150–450)
PMV BLD AUTO: 9.8 FL (ref 8–12)
POTASSIUM BLD-SCNC: 3.9 MMOL/L (ref 3.5–5.1)
RBC # BLD AUTO: 4.25 10*6/MM3 (ref 4.37–5.74)
SODIUM BLD-SCNC: 137 MMOL/L (ref 137–145)
TRIGL SERPL-MCNC: 59 MG/DL (ref 20–199)
WBC NRBC COR # BLD: 4.99 10*3/MM3 (ref 3.2–9.8)

## 2018-10-03 PROCEDURE — 85027 COMPLETE CBC AUTOMATED: CPT | Performed by: INTERNAL MEDICINE

## 2018-10-03 PROCEDURE — 80048 BASIC METABOLIC PNL TOTAL CA: CPT | Performed by: INTERNAL MEDICINE

## 2018-10-03 PROCEDURE — 80061 LIPID PANEL: CPT | Performed by: INTERNAL MEDICINE

## 2018-10-03 PROCEDURE — 99212 OFFICE O/P EST SF 10 MIN: CPT | Performed by: INTERNAL MEDICINE

## 2018-10-03 RX ORDER — AMLODIPINE BESYLATE 5 MG/1
5 TABLET ORAL
Qty: 30 TABLET | Refills: 6 | Status: SHIPPED | OUTPATIENT
Start: 2018-10-03 | End: 2018-12-11 | Stop reason: SDUPTHER

## 2018-10-03 RX ORDER — AMLODIPINE BESYLATE 5 MG/1
5 TABLET ORAL
Status: DISCONTINUED | OUTPATIENT
Start: 2018-10-03 | End: 2018-10-03 | Stop reason: HOSPADM

## 2018-10-03 RX ORDER — CLOPIDOGREL BISULFATE 75 MG/1
75 TABLET ORAL DAILY
Qty: 30 TABLET | Refills: 6 | Status: SHIPPED | OUTPATIENT
Start: 2018-10-04 | End: 2018-12-11 | Stop reason: SDUPTHER

## 2018-10-03 RX ADMIN — CLOPIDOGREL BISULFATE 75 MG: 75 TABLET ORAL at 08:21

## 2018-10-03 RX ADMIN — AMLODIPINE BESYLATE 5 MG: 5 TABLET ORAL at 09:21

## 2018-10-03 RX ADMIN — ASPIRIN 81 MG CHEWABLE TABLET 81 MG: 81 TABLET CHEWABLE at 08:21

## 2018-10-03 NOTE — PLAN OF CARE
Problem: Patient Care Overview  Goal: Plan of Care Review  Outcome: Ongoing (interventions implemented as appropriate)   10/03/18 0602   Coping/Psychosocial   Plan of Care Reviewed With patient   Plan of Care Review   Progress no change   OTHER   Outcome Summary groin site soft to touch and distal pulse palpable. vss. pt being watched for etoch withdrawal . pt has had tremors and was placed on ciwa for the night     Goal: Individualization and Mutuality  Outcome: Ongoing (interventions implemented as appropriate)      Problem: Pain, Acute (Adult)  Goal: Identify Related Risk Factors and Signs and Symptoms  Outcome: Ongoing (interventions implemented as appropriate)    Goal: Acceptable Pain Control/Comfort Level  Outcome: Ongoing (interventions implemented as appropriate)      Problem: Cardiac Catheterization (Diagnostic/Interventional) (Adult)  Goal: Signs and Symptoms of Listed Potential Problems Will be Absent, Minimized or Managed (Cardiac Catheterization)  Outcome: Ongoing (interventions implemented as appropriate)      Problem: Cardiac: ACS (Acute Coronary Syndrome) (Adult)  Goal: Signs and Symptoms of Listed Potential Problems Will be Absent, Minimized or Managed (Cardiac: ACS)  Outcome: Outcome(s) achieved Date Met: 10/03/18

## 2018-10-03 NOTE — DISCHARGE SUMMARY
[facility]  INPATIENT DISCHARGE SUMMARY    Date of Discharge:  10/3/2018    Discharge Diagnosis: Peripheral vascular disease with severe claudication  Hypertension  CAD        Hospital Course  Patient is a 57 y.o. male presented with claudication pain and was brought for elective PTA to the left SFA and he did well.  He had rotational atherectomy and drug-coated balloon angioplasty    Procedures Performed  Procedure(s):  Abdominal Aortogram , orbital elliptical atherectomy of the left SFA, balloon angioplasty with a drug-coated balloon       Consults:   Consults     No orders found for last 30 day(s).          Pertinent Test Results:     Lab Results (last 24 hours)     Procedure Component Value Units Date/Time    Lipid Panel [[id number]]  (Abnormal) Collected:  10/03/18 0551    Specimen:  Blood Updated:  10/03/18 0636     Total Cholesterol 102 mg/dL      Triglycerides 59 mg/dL      HDL Cholesterol 38 (L) mg/dL      LDL Cholesterol  42 mg/dL      LDL/HDL Ratio 1.37    Basic Metabolic Panel [[id number]]  (Abnormal) Collected:  10/03/18 0551    Specimen:  Blood Updated:  10/03/18 0625     Glucose 94 mg/dL      BUN 8 mg/dL      Creatinine 0.70 mg/dL      Sodium 137 mmol/L      Potassium 3.9 mmol/L      Chloride 105 mmol/L      CO2 25.0 mmol/L      Calcium 8.2 (L) mg/dL      eGFR Non African Amer 115 mL/min/1.73      BUN/Creatinine Ratio 11.4     Anion Gap 7.0 mmol/L     CBC (No Diff) [[id number]]  (Abnormal) Collected:  10/03/18 0551    Specimen:  Blood Updated:  10/03/18 0624     WBC 4.99 10*3/mm3      RBC 4.25 (L) 10*6/mm3      Hemoglobin 13.3 (L) g/dL      Hematocrit 38.8 (L) %      MCV 91.3 fL      MCH 31.3 pg      MCHC 34.3 g/dL      RDW 13.8 %      RDW-SD 45.9 (H) fl      MPV 9.8 fL      Platelets 116 (L) 10*3/mm3      Comment: Specimen reanalyzed to confirm platelet count                 Condition on Discharge:  stable    Vital Signs  Temp:  [96.3 °F (35.7 °C)-98 °F (36.7 °C)] 97.7 °F  (36.5 °C)  Heart Rate:  [51-71] 63  Resp:  [18-20] 18  BP: (146-176)/(76-97) 176/87    Discharge Disposition  Home or Self Care    Discharge Medications     Discharge Medications      New Medications      Instructions Start Date   amLODIPine 5 MG tablet  Commonly known as:  NORVASC   5 mg, Oral, Every 24 Hours Scheduled      clopidogrel 75 MG tablet  Commonly known as:  PLAVIX   75 mg, Oral, Daily         Continue These Medications      Instructions Start Date   aspirin 81 MG chewable tablet   81 mg, Oral, Daily      atorvastatin 20 MG tablet  Commonly known as:  LIPITOR   20 mg, Oral, Nightly      cetirizine 10 MG tablet  Commonly known as:  zyrTEC   10 mg, Oral, Daily      lisinopril 20 MG tablet  Commonly known as:  PRINIVIL,ZESTRIL   20 mg, Oral, Nightly      TOPROL XL 50 MG 24 hr tablet  Generic drug:  metoprolol succinate XL   50 mg, Oral, Nightly             Discharge Diet:  cardiac diet    Activity at Discharge: as tolerated    Follow-up Appointments  Future Appointments  Date Time Provider Department Center   10/18/2018 8:00 AM Dave Garza MD [facility] None         Test Results Pending at Discharge        Dave Garza MD    EMR Dragon/Transcription disclaimer:   Some of this note may be an electronic transcription/translation of spoken language to printed text. The electronic translation of spoken language may permit erroneous, or at times, nonsensical words or phrases to be inadvertently transcribed; Although I have reviewed the note for such errors, some may still exist.

## 2018-10-18 ENCOUNTER — OFFICE VISIT (OUTPATIENT)
Dept: CARDIOLOGY | Facility: CLINIC | Age: 58
End: 2018-10-18

## 2018-10-18 VITALS
WEIGHT: 216 LBS | HEART RATE: 71 BPM | SYSTOLIC BLOOD PRESSURE: 140 MMHG | DIASTOLIC BLOOD PRESSURE: 90 MMHG | HEIGHT: 67 IN | BODY MASS INDEX: 33.9 KG/M2

## 2018-10-18 DIAGNOSIS — Z72.0 NICOTINE ABUSE: ICD-10-CM

## 2018-10-18 DIAGNOSIS — E78.00 PURE HYPERCHOLESTEROLEMIA: ICD-10-CM

## 2018-10-18 DIAGNOSIS — I73.9 PVD (PERIPHERAL VASCULAR DISEASE) WITH CLAUDICATION (HCC): ICD-10-CM

## 2018-10-18 DIAGNOSIS — I10 ESSENTIAL HYPERTENSION: Primary | ICD-10-CM

## 2018-10-18 DIAGNOSIS — F10.10 ETOH ABUSE: ICD-10-CM

## 2018-10-18 PROCEDURE — 99213 OFFICE O/P EST LOW 20 MIN: CPT | Performed by: INTERNAL MEDICINE

## 2018-10-18 NOTE — PROGRESS NOTES
"  [facility]  OFFICE NOTE    Nikhil Hernadez  57 y.o. male    10/18/2018  1. Essential hypertension    2. Pure hypercholesterolemia    3. PVD (peripheral vascular disease) with claudication (CMS/HCC)    4. Nicotine abuse    5. ETOH abuse        Chief complaint -preop hip surgery/ status post PTA to the left SFA    History of present Illness- 57-year-old gentleman was history of coronary disease status post two-vessel bypass in 1995 at [facility] in the setting of an acute MI has not been followed regularly since 2000.  He is coming for a preop hip surgery.  He smokes about a pack-a-day for many years, and he drinks pretty heavy and even smell of alcohol right now at 8:30 AM.  He had total occlusion of the left SFA which was treated with \"atherectomy and drug-coated balloon.  He is on Plavix and aspirin.  Prior to his hip surgery he can stop the Plavix and aspirin 5 days prior to the surgery.  He can proceed with moderate risk by ACC /AHA guidelines      Allergies   Allergen Reactions   • Aleve [Naproxen Sodium] Itching, Swelling and Rash   • Morphine And Related Nausea And Vomiting         Past Medical History:   Diagnosis Date   • Gallstones    • Hyperlipidemia    • Hypertension    • Myocardial infarction (CMS/HCC)          Past Surgical History:   Procedure Laterality Date   • ARTERIAL BYPASS SURGERY     • CORONARY ARTERY BYPASS GRAFT     • INTERVENTIONAL RADIOLOGY PROCEDURE N/A 10/2/2018    Procedure: Abdominal Aortogram;  Surgeon: Dave Garza MD;  Location: [facility] INVASIVE LOCATION;  Service: Cardiology         Family History   Problem Relation Age of Onset   • Cancer Mother    • Cancer Father          Social History     Social History   • Marital status:      Spouse name: N/A   • Number of children: N/A   • Years of education: N/A     Occupational History   • Not on file.     Social History Main Topics   • Smoking status: Current Every Day Smoker     " "Packs/day: 1.00     Types: Cigarettes   • Smokeless tobacco: Never Used   • Alcohol use 9.0 oz/week     15 Shots of liquor per week   • Drug use: No   • Sexual activity: Defer     Other Topics Concern   • Not on file     Social History Narrative   • No narrative on file         Current Outpatient Prescriptions   Medication Sig Dispense Refill   • amLODIPine (NORVASC) 5 MG tablet Take 1 tablet by mouth Daily. 30 tablet 6   • aspirin 81 MG chewable tablet Chew 81 mg Daily.     • atorvastatin (LIPITOR) 20 MG tablet Take 20 mg by mouth Every Night.     • cetirizine (zyrTEC) 10 MG tablet Take 10 mg by mouth Daily.     • clopidogrel (PLAVIX) 75 MG tablet Take 1 tablet by mouth Daily. 30 tablet 6   • lisinopril (PRINIVIL,ZESTRIL) 20 MG tablet Take 20 mg by mouth Every Night.     • TOPROL XL 50 MG 24 hr tablet Take 50 mg by mouth Every Night.       No current facility-administered medications for this visit.          Review of Systems     Constitution: Denies any fatigue, fever or chills    HENT: Denies any headache, hearing impairment,     Eyes: Denies any blurring of vision, or photophobia     Cardivascular - As per history of present illness     Respiratory system-  COPD,  shortness of breath     Endocrine:   history of hyperlipidemia,                       Musculoskeletal:  Significant arthritis of the hips    Gastrointestinal: No nausea, vomiting, or melena    Genitourinary: No dysuria or hematuria    Neurological:   No history of seizure disorder, stroke, memory problems    Psychiatric/Behavioral:        No history of depression    Hematological- no history of easy bruising or any bleeding diathesis            OBJECTIVE    /90   Pulse 71   Ht 170.2 cm (67.01\")   Wt 98 kg (216 lb)   BMI 33.82 kg/m²       Physical Exam     Constitutional: is oriented to person, place, and time.     Skin-warm and dry    Well developed and nourished in no acute distress      Head: Normocephalic and atraumatic.     Eyes: Pupils " are equal    Neck: Neck supple. No bruit in the carotids    Cardiovascular: [place] in the fifth intercostal space   Regular rate, and  Rhythm,    S1 greater than S2, no S3 or S4, no gallop     Pulmonary/Chest:   Air  Entry is equal on both sides  Scattered rhonchi at the base on both sides      Abdominal: Soft.  No hepatosplenomegaly, bowel sounds are present    Musculoskeletal: No kyphoscoliosis, no significant thickening of the joints    Neurological: is alert and oriented to person, place, and time.    cranial nerve are intact .   No motor or sensory deficit    Extremities-no edema, no radial femoral delay      Psychiatric: He has a normal mood and affect.                  His behavior is normal.           Procedures      A/P    CAD status post CABG with 2 vessel bypass in 1995 for preop hip surgery with long-standing history of tobacco use and alcohol abuse.  Stress test showed  intermediate risk study and no chest pain.  Can proceed with surgery with moderate risk    Peripheral vascular disease -talus post PTA to the left SFA and will continue aspirin and Plavix.  We'll repeat another EDGARDO in 3 months    Hypertension/hyperlipidemia on atorvastatin and lisinopril with Toprol-XL.    Tobacco abuse/EtOH abuse-needs risk factor modification.    Follow-up in 6  months            This document has been electronically signed by Dave Garza MD on October 18, 2018 8:16 AM       EMR Dragon/Transcription disclaimer:   Some of this note may be an electronic transcription/translation of spoken language to printed text. The electronic translation of spoken language may permit erroneous, or at times, nonsensical words or phrases to be inadvertently transcribed; Although I have reviewed the note for such errors, some may still exist.

## 2018-11-29 ENCOUNTER — TRANSCRIBE ORDERS (OUTPATIENT)
Dept: PHYSICAL THERAPY | Facility: HOSPITAL | Age: 58
End: 2018-11-29

## 2018-11-29 DIAGNOSIS — M25.551 RIGHT HIP PAIN: ICD-10-CM

## 2018-11-29 DIAGNOSIS — Z96.641 HISTORY OF RIGHT HIP REPLACEMENT: Primary | ICD-10-CM

## 2018-12-06 ENCOUNTER — HOSPITAL ENCOUNTER (OUTPATIENT)
Dept: PHYSICAL THERAPY | Facility: HOSPITAL | Age: 58
Setting detail: THERAPIES SERIES
Discharge: HOME | End: 2018-12-06

## 2018-12-06 DIAGNOSIS — Z96.641 STATUS POST TOTAL REPLACEMENT OF RIGHT HIP: Primary | ICD-10-CM

## 2018-12-06 PROCEDURE — 97110 THERAPEUTIC EXERCISES: CPT | Performed by: PHYSICAL THERAPIST

## 2018-12-06 PROCEDURE — 97162 PT EVAL MOD COMPLEX 30 MIN: CPT | Performed by: PHYSICAL THERAPIST

## 2018-12-11 ENCOUNTER — HOSPITAL ENCOUNTER (OUTPATIENT)
Dept: PHYSICAL THERAPY | Facility: HOSPITAL | Age: 58
Setting detail: THERAPIES SERIES
Discharge: HOME | End: 2018-12-11

## 2018-12-11 DIAGNOSIS — Z96.641 STATUS POST TOTAL REPLACEMENT OF RIGHT HIP: Primary | ICD-10-CM

## 2018-12-11 PROCEDURE — 97110 THERAPEUTIC EXERCISES: CPT

## 2018-12-11 RX ORDER — CLOPIDOGREL BISULFATE 75 MG/1
75 TABLET ORAL DAILY
Qty: 90 TABLET | Refills: 2 | Status: SHIPPED | OUTPATIENT
Start: 2018-12-11 | End: 2018-12-14 | Stop reason: SDUPTHER

## 2018-12-11 RX ORDER — AMLODIPINE BESYLATE 5 MG/1
5 TABLET ORAL
Qty: 90 TABLET | Refills: 2 | Status: SHIPPED | OUTPATIENT
Start: 2018-12-11 | End: 2018-12-14 | Stop reason: SDUPTHER

## 2018-12-11 NOTE — THERAPY TREATMENT NOTE
Outpatient Physical Therapy Ortho Treatment Note  [facility]  Maryann Riojas PTA       Patient Name: Nikhil Hernadez  : 1960  MRN: [id number]  Today's Date: 2018      Visit Date: 2018     Visits: 2/2  Insurance Visits Approved: 60 visits  Recert Due: 2018  MD Appt: 2018  Pain: pretreatment 10; post treatment 7/10  Improvement: pt is subjectively reporting 0% improvement since initial evaluation    Visit Dx:    ICD-10-CM ICD-9-CM   1. Status post total replacement of right hip Z96.641 V43.64       Patient Active Problem List   Diagnosis   • Essential hypertension   • Pure hypercholesterolemia   • Nicotine abuse   • Pre-operative clearance   • ETOH abuse   • PVD (peripheral vascular disease) with claudication (CMS/HCC)        Past Medical History:   Diagnosis Date   • Gallstones    • Hyperlipidemia    • Hypertension    • Myocardial infarction (CMS/HCC)         Past Surgical History:   Procedure Laterality Date   • ARTERIAL BYPASS SURGERY     • CORONARY ARTERY BYPASS GRAFT     • TOTAL HIP ARTHROPLASTY Right 2018       PT Ortho     Row Name 18 0800       Subjective Comments    Subjective Comments  states that he has had a lot of swelling since starting physical therapy. reports so much so that he went yesterday back to his doctor at [facility] and had a doppler/US done to rule out blood clots. states that it was negative and they didn't have any indication as to why he was swelling.   -       Precautions and Contraindications    Precautions/Limitations  hip precautions- right  -      User Key  (r) = Recorded By, (t) = Taken By, (c) = Cosigned By    Initials Name Provider Type     Maryann Riojas PTA Physical Therapy Assistant                      PT Assessment/Plan     Row Name 18 0900          PT Assessment    Assessment Comments  patient did well with all therex completed today. does have visible edema in R LE  -        PT Plan     PT Frequency  2x/week  -     PT Plan Comments  standing hip 3 way  -       User Key  (r) = Recorded By, (t) = Taken By, (c) = Cosigned By    Initials Name Provider Type     MoisemaryMaryann PTA Physical Therapy Assistant          Modalities     Row Name 12/11/18 0800             Ice    Ice Applied  Yes  -      Location  Right Anterior and lateral hip semireclined  -      Rx Minutes  15 mins  -      Ice S/P Rx  Yes  -        User Key  (r) = Recorded By, (t) = Taken By, (c) = Cosigned By    Initials Name Provider Type     MoisemaryMaryann PTA Physical Therapy Assistant          Exercises     Row Name 12/11/18 0800             Subjective Comments    Subjective Comments  states that he has had a lot of swelling since starting physical therapy. reports so much so that he went yesterday back to his doctor at [facility] and had a doppler/US done to rule out blood clots. states that it was negative and they didn't have any indication as to why he was swelling.   -         Subjective Pain    Able to rate subjective pain?  yes  -      Pre-Treatment Pain Level  7  -      Post-Treatment Pain Level  -- can't really tell a differance.   -         Exercise 1    Exercise Name 1  Pro II LE's  -      Time 1  10 minutes  -      Additional Comments  L 1.0  -         Exercise 2    Exercise Name 2  sit to/from stand  -      Sets 2  2  -      Reps 2  10  -MH         Exercise 3    Exercise Name 3  St. Heel Raises  -      Reps 3  20  -MH         Exercise 4    Exercise Name 4  Bridges  -      Reps 4  20  -      Time 4  5 sec hold  -         Exercise 5    Exercise Name 5  Heel Slides  -      Time 5  4 minutes  -         Exercise 6    Exercise Name 6  Hooklying Marching  -      Time 6  4 minutes  -         Exercise 7    Exercise Name 7  Supine SAQ  -      Reps 7  20  -      Time 7  5 sec hold  -        User Key  (r) = Recorded By, (t) = Taken By, (c) = Cosigned By    Initials Name Provider Type     Maryann Suh PTA Physical Therapy Assistant                         PT OP Goals     Row Name 12/11/18 0900          PT Short Term Goals    STG Date to Achieve  12/27/18  -     STG 1  I with HEP and have additions/changes by next recertification.  -     STG 1 Progress  Ongoing  -     STG 2  Patient able ot verbalize all 3 hip precautins with no cueing.  -     STG 2 Progress  Ongoing  -     STG 3  Patient able ot ambualte with more heel to toe gait with RW >= 150'.  -     STG 3 Progress  Ongoing  -     STG 4  Patient able to perform standing alternating marching with R hip flexion >= 80°.  -     STG 4 Progress  Ongoing  -     STG 5  Patient able to eprform 20 SLR, no lag present.  -     STG 5 Progress  Ongoing  [facility]        Long Term Goals    LTG Date to Achieve  01/18/19  -     LTG 1  AROm ro R hip all WFL, ext >= 10°.  -     LTG 1 Progress  Ongoing  -     LTG 2  B LE 5/5  -     LTG 2 Progress  Ongoing  -     LTG 3  Patient able ot perform sit to/from stand x20 with 1 UE A.  -     LTG 3 Progress  Ongoing  -     LTG 4  Patient able to ambulate up/down 3 steps reciprocally x5 reps HRA for balance.  -     LTG 4 Progress  Ongoing  -     LTG 5  Patient able to ambulate >= 600' non-antalgically no increas ein pain.  -     LTG 5 Progress  Ongoing  [facility]     LTG 6  I with final HEP.  -     LTG 6 Progress  Ongoing  [facility]     LTG 7  D/C with a final HEp and free 30 day fitness formula mememberip.  -     LTG 7 Progress  Ongoing  [facility]        Time Calculation    PT Goal Re-Cert Due Date  12/27/18  -       User Key  (r) = Recorded By, (t) = Taken By, (c) = Cosigned By    Initials Name Provider Type    Maryann Suh PTA Physical Therapy Assistant          Therapy Education  Given: HEP, Symptoms/condition management, Pain management, Mobility training, Edema management, Bandaging/dressing change, Fall prevention and home safety  Program: Reinforced  How Provided: Verbal,  Demonstration  Provided to: Patient  Level of Understanding: Verbalized, Demonstrated              Time Calculation:   Start Time: 0852  Stop Time: 0948  Time Calculation (min): 56 min  PT Non-Billable Time (min): 15 min  Total Timed Code Minutes- PT: 41 minute(s)    Therapy Charges for Today     Code Description Service Date Service Provider Modifiers Qty    66947971900 HC PT THER PROC EA 15 MIN 12/11/2018 Maryann Riojas PTA GP 3    82092806308 HC PT THER SUPP EA 15 MIN 12/11/2018 Maryann Riojas PTA GP 1                    Maryann Riojas PTA  12/11/2018

## 2018-12-13 ENCOUNTER — HOSPITAL ENCOUNTER (OUTPATIENT)
Dept: PHYSICAL THERAPY | Facility: HOSPITAL | Age: 58
Setting detail: THERAPIES SERIES
Discharge: HOME | End: 2018-12-13

## 2018-12-13 DIAGNOSIS — Z96.641 STATUS POST TOTAL REPLACEMENT OF RIGHT HIP: Primary | ICD-10-CM

## 2018-12-13 PROCEDURE — 97110 THERAPEUTIC EXERCISES: CPT

## 2018-12-13 NOTE — THERAPY TREATMENT NOTE
"    Outpatient Physical Therapy Ortho Treatment Note  [facility]  Maryann Riojas PTA       Patient Name: Nikhil Hernadez  : 1960  MRN: [id number]  Today's Date: 2018      Visit Date: 2018     Visits: 3/3  Insurance Visits Approved: 60 visits  Recert Due: 2018  MD Appt: 2018  Pain: pretreatment 310; post treatment \"same as when I came in\"/10  Improvement: pt is subjectively reporting 0% improvement since initial evaluation    Visit Dx:    ICD-10-CM ICD-9-CM   1. Status post total replacement of right hip Z96.641 V43.64       Patient Active Problem List   Diagnosis   • Essential hypertension   • Pure hypercholesterolemia   • Nicotine abuse   • Pre-operative clearance   • ETOH abuse   • PVD (peripheral vascular disease) with claudication (CMS/HCC)        Past Medical History:   Diagnosis Date   • Gallstones    • Hyperlipidemia    • Hypertension    • Myocardial infarction (CMS/HCC)         Past Surgical History:   Procedure Laterality Date   • ARTERIAL BYPASS SURGERY     • CORONARY ARTERY BYPASS GRAFT     • TOTAL HIP ARTHROPLASTY Right 2018       PT Ortho     Row Name 18 08       Subjective Comments    Subjective Comments  pt reports that he stopped wearing tight undergarmets, and just wearing the hospital pants tied loosely, since then he has had frequent urination which has resulted in reduced edema and pain. feels better today.   -       Precautions and Contraindications    Precautions/Limitations  hip precautions- right  -       Subjective Pain    Able to rate subjective pain?  yes  -    Pre-Treatment Pain Level  3  -    Post-Treatment Pain Level  -- \"same as when I came in\"  -    Row Name 18 0800       Subjective Comments    Subjective Comments  states that he has had a lot of swelling since starting physical therapy. reports so much so that he went yesterday back to his doctor at [facility] and had a doppler/US done to rule out " "blood clots. states that it was negative and they didn't have any indication as to why he was swelling.   -       Precautions and Contraindications    Precautions/Limitations  hip precautions- right  -      User Key  (r) = Recorded By, (t) = Taken By, (c) = Cosigned By    Initials Name Provider Type     Maryann Riojas PTA Physical Therapy Assistant                      PT Assessment/Plan     Row Name 12/13/18 0800          PT Assessment    Assessment Comments  patient initiated treatment with significantly reduced pain than has previously reported. pt reporting decreased edema as well. patient tolerates increased activity this treatment. good effort throughout treatment  -        PT Plan    PT Frequency  2x/week  -     PT Plan Comments  next visit Step Taps utilizing 6 inch or greater step to fasciliate hip flexion  -       User Key  (r) = Recorded By, (t) = Taken By, (c) = Cosigned By    Initials Name Provider Type     Maryann Riojas PTA Physical Therapy Assistant              Exercises     Row Name 12/13/18 0800             Subjective Comments    Subjective Comments  pt reports that he stopped wearing tight undergarmets, and just wearing the hospital pants tied loosely, since then he has had frequent urination which has resulted in reduced edema and pain. feels better today.   -         Subjective Pain    Able to rate subjective pain?  yes  -      Pre-Treatment Pain Level  3  -      Post-Treatment Pain Level  -- \"same as when I came in\"  -         Exercise 1    Exercise Name 1  Pro II LE's  -      Time 1  10 minutes  -      Additional Comments  L 4.0  -         Exercise 2    Exercise Name 2  B St. HS S  -      Reps 2  2  -      Time 2  30 sec hold  -         Exercise 3    Exercise Name 3  B Incline Calf S  -      Reps 3  2  -      Time 3  30 sec hold  -         Exercise 4    Exercise Name 4  St. HR/TR  -      Reps 4  20  -         Exercise 5    Exercise Name 5  Sit " to/from Stand  -      Sets 5  2  -MH      Reps 5  10  -         Exercise 6    Exercise Name 6  B St. Hip 3 way  -MH      Sets 6  2  -MH      Reps 6  10  -MH         Exercise 7    Exercise Name 7  St. Marching  -MH      Sets 7  3  -MH      Time 7  30 secs  -         Exercise 8    Exercise Name 8  Bridges  -MH      Sets 8  2  -MH      Reps 8  10  -MH         Exercise 9    Exercise Name 9  quad sets  -MH      Reps 9  20  -MH      Time 9  5 sec hold  -      Additional Comments  attempted SLR Fwd flex but sig increase in pain so held off  -         Exercise 10    Exercise Name 10  Supine SAQ  -      Sets 10  2  -MH      Reps 10  15  -MH        User Key  (r) = Recorded By, (t) = Taken By, (c) = Cosigned By    Initials Name Provider Type    Maryann Suh, PTA Physical Therapy Assistant                         PT OP Goals     Row Name 12/13/18 0800          PT Short Term Goals    STG Date to Achieve  12/27/18  -     STG 1  I with HEP and have additions/changes by next recertification.  -     STG 1 Progress  Met  -     STG 2  Patient able ot verbalize all 3 hip precautins with no cueing.  -     STG 2 Progress  Ongoing  -     STG 3  Patient able ot ambualte with more heel to toe gait with RW >= 150'.  -     STG 3 Progress  Ongoing  -     STG 4  Patient able to perform standing alternating marching with R hip flexion >= 80°.  -     STG 4 Progress  Progressing  -     STG 5  Patient able to eprform 20 SLR, no lag present.  -     STG 5 Progress  Progressing  -        Long Term Goals    LTG Date to Achieve  01/18/19  -     LTG 1  AROm ro R hip all WFL, ext >= 10°.  -     LTG 1 Progress  Ongoing  -     LTG 2  B LE 5/5  -     LTG 2 Progress  Ongoing  -     LTG 3  Patient able ot perform sit to/from stand x20 with 1 UE A.  -     LTG 3 Progress  Ongoing  -     LTG 4  Patient able to ambulate up/down 3 steps reciprocally x5 reps HRA for balance.  -     LTG 4 Progress  Ongoing  -      LTG 5  Patient able to ambulate >= 600' non-antalgically no increas ein pain.  -     LTG 5 Progress  Ongoing  -     LTG 6  I with final HEP.  -     LTG 6 Progress  Ongoing  -     LTG 7  D/C with a final HEp and free 30 day fitness formula memembership.  -     LTG 7 Progress  Ongoing  -        Time Calculation    PT Goal Re-Cert Due Date  12/27/18  -       User Key  (r) = Recorded By, (t) = Taken By, (c) = Cosigned By    Initials Name Provider Type     Maryann Riojas PTA Physical Therapy Assistant          Therapy Education  Education Details: B St. Hip 3 way, St. Marching, Quad sets  Given: HEP, Symptoms/condition management, Pain management, Mobility training, Edema management, Bandaging/dressing change, Fall prevention and home safety  Program: New, Reinforced  How Provided: Verbal, Demonstration, Written  Provided to: Patient  Level of Understanding: Verbalized, Demonstrated, Teach back education performed              Time Calculation:   Start Time: 0800  Stop Time: 0906  Time Calculation (min): 66 min  PT Non-Billable Time (min): 20 min  Total Timed Code Minutes- PT: 46 minute(s)    Therapy Charges for Today     Code Description Service Date Service Provider Modifiers Qty    34755266182 HC PT THER PROC EA 15 MIN 12/13/2018 Maryann Riojas PTA GP 3    62145912052 HC PT THER SUPP EA 15 MIN 12/13/2018 Maryann Riojas PTA GP 1                    Maryann Riojas PTA  12/13/2018

## 2018-12-14 RX ORDER — CLOPIDOGREL BISULFATE 75 MG/1
75 TABLET ORAL DAILY
Qty: 90 TABLET | Refills: 2 | Status: SHIPPED | OUTPATIENT
Start: 2018-12-14 | End: 2019-07-15 | Stop reason: SDUPTHER

## 2018-12-14 RX ORDER — AMLODIPINE BESYLATE 5 MG/1
5 TABLET ORAL
Qty: 90 TABLET | Refills: 2 | Status: SHIPPED | OUTPATIENT
Start: 2018-12-14 | End: 2019-07-15 | Stop reason: SDUPTHER

## 2018-12-18 ENCOUNTER — HOSPITAL ENCOUNTER (OUTPATIENT)
Dept: PHYSICAL THERAPY | Facility: HOSPITAL | Age: 58
Setting detail: THERAPIES SERIES
Discharge: HOME | End: 2018-12-18

## 2018-12-18 DIAGNOSIS — Z96.641 STATUS POST TOTAL REPLACEMENT OF RIGHT HIP: Primary | ICD-10-CM

## 2018-12-18 PROCEDURE — 97110 THERAPEUTIC EXERCISES: CPT

## 2018-12-18 NOTE — THERAPY TREATMENT NOTE
Outpatient Physical Therapy Ortho Treatment Note  [facility]     Patient Name: Nikhil Hernadez  : 1960  MRN: [id number]  Today's Date: 2018      Visit Date: 2018  Visits . Recert . MD f/curtis WEST. % improvement.   Visit Dx:    ICD-10-CM ICD-9-CM   1. Status post total replacement of right hip Z96.641 V43.64       Patient Active Problem List   Diagnosis   • Essential hypertension   • Pure hypercholesterolemia   • Nicotine abuse   • Pre-operative clearance   • ETOH abuse   • PVD (peripheral vascular disease) with claudication (CMS/HCC)        Past Medical History:   Diagnosis Date   • Gallstones    • Hyperlipidemia    • Hypertension    • Myocardial infarction (CMS/HCC)         Past Surgical History:   Procedure Laterality Date   • ARTERIAL BYPASS SURGERY     • CORONARY ARTERY BYPASS GRAFT     • TOTAL HIP ARTHROPLASTY Right 2018       PT Ortho     Row Name 18 0800       Subjective Pain    Post-Treatment Pain Level  -- decreased  -JW       Posture/Observations    Posture/Observations Comments  Amb with [facility] [employer].   -      User Key  (r) = Recorded By, (t) = Taken By, (c) = Cosigned By    Initials Name Provider Type    Guicho Christian PTA Physical Therapy Assistant                      PT Assessment/Plan     Row Name 18 0800          PT Assessment    Assessment Comments  Good demo and moraima of today's visit. Hip strength and motion improving with ther ex.   -        PT Plan    PT Frequency  2x/week  -LAUREN     Predicted Duration of Therapy Intervention (Therapy Eval)  4-6 weeks  -     PT Plan Comments  Cont PT per POC  -JW       User Key  (r) = Recorded By, (t) = Taken By, (c) = Cosigned By    Initials Name Provider Type    Guicho Christian PTA Physical Therapy Assistant          Modalities     Row Name 18 0800             Ice    Ice Applied  Yes  -      Location  Right Anterior and lateral hip semireclined  -      Rx Minutes  10 mins   "-JW      Ice S/P Rx  Yes  -JW        User Key  (r) = Recorded By, (t) = Taken By, (c) = Cosigned By    Initials Name Provider Type    Guicho Christian PTA Physical Therapy Assistant          Exercises     Row Name 12/18/18 0800             Subjective Comments    Subjective Comments  Pt reports hip is getting better. He is improving with help of PT.   -JW         Subjective Pain    Able to rate subjective pain?  yes  -JW      Pre-Treatment Pain Level  2  -JW      Post-Treatment Pain Level  -- decreased  -JW         Exercise 1    Exercise Name 1  Pro II LE's  -JW      Time 1  10  -JW      Additional Comments  L4.0  -JW         Exercise 2    Exercise Name 2  B St. HS S  -JW      Reps 2  2  -JW      Time 2  30 sec hold  -JW         Exercise 3    Exercise Name 3  Alt toe touch to step  -JW      Sets 3  2  -JW      Reps 3  10  -JW      Additional Comments  10\"  -JW         Exercise 4    Exercise Name 4  H/L hip add squeeze  -JW      Reps 4  25  -JW         Exercise 5    Exercise Name 5  R BKFO  -JW      Reps 5  20  -JW         Exercise 6    Exercise Name 6  R clamshells  -JW      Sets 6  2  -JW      Reps 6  20  -JW         Exercise 7    Exercise Name 7  Bridges  -JW      Sets 7  2  -JW      Reps 7  10, 15  -JW         Exercise 8    Exercise Name 8  SLR  -JW      Sets 8  2  -JW      Reps 8  10  -JW         Exercise 9    Exercise Name 9  LAQ  -JW      Reps 9  20  -JW      Additional Comments  3# AW  -JW         Exercise 10    Exercise Name 10  Seated HS curls  -JW      Reps 10  20  -JW      Additional Comments  red tb  -JW         Exercise 11    Exercise Name 11  // bars: sidestep, march  -JW      Reps 11  3 trips ea  -JW         Exercise 12    Exercise Name 12  4\" step up  -JW      Time 12  20  -JW        User Key  (r) = Recorded By, (t) = Taken By, (c) = Cosigned By    Initials Name Provider Type    Guicho Christian PTA Physical Therapy Assistant                         PT OP Goals     Row Name 12/18/18 0949 " 12/18/18 0800       PT Short Term Goals    STG Date to Achieve  --  12/27/18  -    STG 1  --  I with HEP and have additions/changes by next recertification.  -    STG 1 Progress  --  Met  -    STG 2  --  Patient able ot verbalize all 3 hip precautins with no cueing.  -    STG 2 Progress  --  Ongoing  -    STG 3  --  Patient able ot ambualte with more heel to toe gait with RW >= 150'.  -    STG 3 Progress  --  Ongoing  -    STG 4  --  Patient able to perform standing alternating marching with R hip flexion >= 80°.  -    STG 4 Progress  --  Progressing  -    STG 5  --  Patient able to eprform 20 SLR, no lag present.  -    STG 5 Progress  --  Progressing  -       Long Term Goals    LTG Date to Achieve  --  01/18/19  -    LTG 1  --  AROm ro R hip all WFL, ext >= 10°.  -    LTG 1 Progress  --  Ongoing  -    LTG 2  --  B LE 5/5  -    LTG 2 Progress  --  Ongoing  -    LTG 3  --  Patient able ot perform sit to/from stand x20 with 1 UE A.  -    LTG 3 Progress  --  Ongoing  -    LTG 4  --  Patient able to ambulate up/down 3 steps reciprocally x5 reps HRA for balance.  -    LTG 4 Progress  --  Ongoing  -    LTG 5  --  Patient able to ambulate >= 600' non-antalgically no increas ein pain.  -    LTG 5 Progress  --  Ongoing  -    LTG 6  --  I with final HEP.  -    LT 6 Progress  --  Ongoing  -    LTG 7  --  D/C with a final HEp and free 30 day fitness formula memembership.  -    LTG 7 Progress  --  Ongoing  -       Time Calculation    PT Goal Re-Cert Due Date  12/27/18  -  --      User Key  (r) = Recorded By, (t) = Taken By, (c) = Cosigned By    Initials Name Provider Type    Guicho Christian, PTA Physical Therapy Assistant                         Time Calculation:   Start Time: 0800  Stop Time: 0857  Time Calculation (min): 57 min  PT Non-Billable Time (min): 10 min  Total Timed Code Minutes- PT: 47 minute(s)  Therapy Suggested Charges     Code   Minutes Charges    None            Therapy Charges for Today     Code Description Service Date Service Provider Modifiers Qty    18577380723  PT THER SUPP EA 15 MIN 12/18/2018 Guicho Strauss, PTA GP 1    [id number]  PT THER PROC EA 15 MIN 12/18/2018 Guicho Strauss, ALLEN GP 3                    Guicho Strauss PTA  12/18/2018

## 2018-12-20 ENCOUNTER — HOSPITAL ENCOUNTER (OUTPATIENT)
Dept: PHYSICAL THERAPY | Facility: HOSPITAL | Age: 58
Setting detail: THERAPIES SERIES
Discharge: HOME | End: 2018-12-20

## 2018-12-20 DIAGNOSIS — Z96.641 STATUS POST TOTAL REPLACEMENT OF RIGHT HIP: Primary | ICD-10-CM

## 2018-12-20 PROCEDURE — 97110 THERAPEUTIC EXERCISES: CPT

## 2018-12-20 NOTE — THERAPY TREATMENT NOTE
"    Outpatient Physical Therapy Ortho Treatment Note  [facility]  Maryann Riojas PTA       Patient Name: Nikhil Hernadez  : 1960  MRN: [id number]  Today's Date: 2018      Visit Date: 2018     Visits: 5/5  Insurance Visits Approved: 60 visits  Recert Due: 2018  MD Appt: 2019  Pain: pretreatment sore/10; post treatment \"sore\"/10  Improvement: pt is subjectively reporting \"yes\"% improvement since initial evaluation    Visit Dx:    ICD-10-CM ICD-9-CM   1. Status post total replacement of right hip Z96.641 V43.64       Patient Active Problem List   Diagnosis   • Essential hypertension   • Pure hypercholesterolemia   • Nicotine abuse   • Pre-operative clearance   • ETOH abuse   • PVD (peripheral vascular disease) with claudication (CMS/HCC)        Past Medical History:   Diagnosis Date   • Gallstones    • Hyperlipidemia    • Hypertension    • Myocardial infarction (CMS/HCC)         Past Surgical History:   Procedure Laterality Date   • ARTERIAL BYPASS SURGERY     • CORONARY ARTERY BYPASS GRAFT     • INTERVENTIONAL RADIOLOGY PROCEDURE N/A 10/2/2018    Procedure: Abdominal Aortogram;  Surgeon: Dave Garza MD;  Location: [facility] INVASIVE LOCATION;  Service: Cardiology   • TOTAL HIP ARTHROPLASTY Right 2018       PT Ortho     Row Name 18 08       Subjective Comments    Subjective Comments  states that he probably walked about a mile yesterday. went out to breakfast and then a few other places. reports that he walked around at home iwht no assistive deviec  -       Precautions and Contraindications    Precautions/Limitations  hip precautions- right  -       Subjective Pain    Able to rate subjective pain?  yes  -    Pre-Treatment Pain Level  0 soreness but not pain  -    Post-Treatment Pain Level  0 sore  -       Posture/Observations    Posture/Observations Comments  amb with RW  -    Row Name 18 0800       Subjective Pain "    Post-Treatment Pain Level  -- decreased  -       Posture/Observations    Posture/Observations Comments  Amb with RW. R AG.   -      User Key  (r) = Recorded By, (t) = Taken By, (c) = Cosigned By    Initials Name Provider Type     Maryann Riojas, ALLEN Physical Therapy Assistant    JW Guicho Strauss, ALLEN Physical Therapy Assistant                      PT Assessment/Plan     Row Name 12/20/18 0800          PT Assessment    Assessment Comments  patient did well today in therex. is able to verbalize his 3 hip precautions wiht no cueing. patient able to maneuver 3 stairs up and down in a reciprocal pattern with no incresae in pain. ambulates with standard cane with no increase in pain but does have decreased hip extension on right with ambulation. worked in Pbars with retro walking to improve. patient able to sit to/from stand with no UE. progressing well.   -        PT Plan    PT Frequency  2x/week  -     PT Plan Comments  continue working on improving extension iwth gait. add shuttle next visit  -       User Key  (r) = Recorded By, (t) = Taken By, (c) = Cosigned By    Initials Name Provider Type     Maryann Riojas PTA Physical Therapy Assistant          Modalities     Row Name 12/20/18 0800             Ice    Ice Applied  Yes  -      Location  right anterior thigh  -      Rx Minutes  15 mins  -      Ice S/P Rx  Yes  -        User Key  (r) = Recorded By, (t) = Taken By, (c) = Cosigned By    Initials Name Provider Type     Maryann Riojas PTA Physical Therapy Assistant          Exercises     Row Name 12/20/18 0800             Subjective Comments    Subjective Comments  states that he probably walked about a mile yesterday. went out to breakfast and then a few other places. reports that he walked around at home iw no assistive deviec  -         Subjective Pain    Able to rate subjective pain?  yes  -      Pre-Treatment Pain Level  0 soreness but not pain  -      Post-Treatment Pain  Level  0 sore  -         Exercise 1    Exercise Name 1  Pro II LE's  -      Time 1  10 minutes  -      Additional Comments  L 5.0  -         Exercise 2    Exercise Name 2  B St. HS S  -      Reps 2  2  -      Time 2  30 sec hold  -         Exercise 3    Exercise Name 3  Step Up Fwd  -      Reps 3  20  -MH      Additional Comments  6 inch  -         Exercise 4    Exercise Name 4  Lat Step Up  -      Reps 4  20  -MH      Additional Comments  6 inch   -         Exercise 5    Exercise Name 5  Ecc Step Downs  -      Reps 5  20  -MH      Additional Comments  6 inch  -         Exercise 6    Exercise Name 6  Reciprocal Stairs  -      Reps 6  10  -         Exercise 7    Exercise Name 7  Sit to/from Stand   -      Reps 7  20  -MH      Additional Comments  no UE  -         Exercise 8    Exercise Name 8  St. Alt Marching Knees to Bar  -      Reps 8  20  -MH      Additional Comments  approximately 80° hip flexon  -         Exercise 9    Exercise Name 9  ambulate with Straight Cane  -      Reps 9  500 feet  -      Additional Comments  non antalgic but has decreased hip extension on R LE  -         Exercise 10    Exercise Name 10  retro stepping in Pbars  -      Time 10  4 minutes  -         Exercise 11    Exercise Name 11  Scar massage  -      Time 11  5 minutes  -        User Key  (r) = Recorded By, (t) = Taken By, (c) = Cosigned By    Initials Name Provider Type    Maryann Suh, PTA Physical Therapy Assistant                         PT OP Goals     Row Name 12/20/18 0800          PT Short Term Goals    STG Date to Achieve  12/27/18  -     STG 1  I with HEP and have additions/changes by next recertification.  -     STG 1 Progress  Met  -     STG 2  Patient able ot verbalize all 3 hip precautins with no cueing.  -     STG 2 Progress  Met  -     STG 3  Patient able ot ambualte with more heel to toe gait with RW >= 150'.  -     STG 3 Progress  Met  -     STG 4   Patient able to perform standing alternating marching with R hip flexion >= 80°.  -     STG 4 Progress  Met  -     STG 5  Patient able to eprform 20 SLR, no lag present.  -     STG 5 Progress  Progressing  -        Long Term Goals    LTG Date to Achieve  01/18/19  -     LTG 1  AROm ro R hip all WFL, ext >= 10°.  -     LTG 1 Progress  Ongoing  -     LTG 2  B LE 5/5  -     LTG 2 Progress  Ongoing  -     LTG 3  Patient able ot perform sit to/from stand x20 with 1 UE A.  -     LTG 3 Progress  Met  -     LTG 4  Patient able to ambulate up/down 3 steps reciprocally x5 reps HRA for balance.  -     LTG 4 Progress  Met  -     LTG 5  Patient able to ambulate >= 600' non-antalgically no increas ein pain.  -     LTG 5 Progress  Ongoing  -     LTG 6  I with final HEP.  -     LTG 6 Progress  Ongoing  [facility]     LTG 7  D/C with a final HEp and [facility].  -     LTG 7 Progress  Ongoing  -        Time Calculation    PT Goal Re-Cert Due Date  12/27/18  -       User Key  (r) = Recorded By, (t) = Taken By, (c) = Cosigned By    Initials Name Provider Type     Maryann Riojas PTA Physical Therapy Assistant          Therapy Education  Education Details: scar massage, retro gait to hep  Given: HEP, Symptoms/condition management, Pain management, Mobility training, Edema management, Bandaging/dressing change, Fall prevention and home safety  Program: New, Reinforced  How Provided: Verbal, Demonstration  Provided to: Patient  Level of Understanding: Verbalized, Demonstrated              Time Calculation:   Start Time: 0800  Stop Time: 0902  Time Calculation (min): 62 min  PT Non-Billable Time (min): 15 min  Total Timed Code Minutes- PT: 47 minute(s)    Therapy Charges for Today     Code Description Service Date Service Provider Modifiers Qty    51636688808 HC PT THER PROC EA 15 MIN 12/20/2018 Maryann Riojas PTA GP 3    27058627134 HC PT THER SUPP EA 15 MIN 12/20/2018 [place]  Maryann BARBOSA, PTA GP 1                    Maryann Riojas, PTA  12/20/2018

## 2018-12-27 ENCOUNTER — HOSPITAL ENCOUNTER (OUTPATIENT)
Dept: PHYSICAL THERAPY | Facility: HOSPITAL | Age: 58
Setting detail: THERAPIES SERIES
Discharge: HOME | End: 2018-12-27

## 2018-12-27 DIAGNOSIS — Z96.641 STATUS POST TOTAL REPLACEMENT OF RIGHT HIP: Primary | ICD-10-CM

## 2018-12-27 PROCEDURE — 97110 THERAPEUTIC EXERCISES: CPT

## 2018-12-27 NOTE — THERAPY TREATMENT NOTE
"    Outpatient Physical Therapy Ortho Treatment Note  [facility]     Patient Name: Nikhil Hernadez  : 1960  MRN: [id number]  Today's Date: 2018      Visit Date: 2018  Pt reports 1/10 pain pre treatment, 0/10 pain post treatment  Reports \"yes% of improvement.  Attended 6/6 visits.  Insurance available: 60 visits  Next MD appt: 2019.  Recertification: 2018.  Visit Dx:    ICD-10-CM ICD-9-CM   1. Status post total replacement of right hip Z96.641 V43.64       Patient Active Problem List   Diagnosis   • Essential hypertension   • Pure hypercholesterolemia   • Nicotine abuse   • Pre-operative clearance   • ETOH abuse   • PVD (peripheral vascular disease) with claudication (CMS/HCC)        Past Medical History:   Diagnosis Date   • Gallstones    • Hyperlipidemia    • Hypertension    • Myocardial infarction (CMS/HCC)         Past Surgical History:   Procedure Laterality Date   • ARTERIAL BYPASS SURGERY     • CORONARY ARTERY BYPASS GRAFT     • INTERVENTIONAL RADIOLOGY PROCEDURE N/A 10/2/2018    Procedure: Abdominal Aortogram;  Surgeon: Dave Garza MD;  Location: [facility] INVASIVE LOCATION;  Service: Cardiology   • TOTAL HIP ARTHROPLASTY Right 2018       PT Ortho     Row Name 18 0800       Precautions and Contraindications    Precautions/Limitations  hip precautions- right  -TL       Subjective Pain    Pre-Treatment Pain Level  0  -TL    Post-Treatment Pain Level  0  -TL      User Key  (r) = Recorded By, (t) = Taken By, (c) = Cosigned By    Initials Name Provider Type    Shirley Dominguez, PTA Physical Therapy Assistant                                                     Therapy Education  Education Details: Seated ham curls and LAQ Blue TB  Given: HEP, Symptoms/condition management  Program: New, Reinforced  How Provided: Verbal, Demonstration  Provided to: Patient  Level of Understanding: Verbalized, Demonstrated              Time " Calculation:   Start Time: 0810  Stop Time: 0903  Time Calculation (min): 53 min  PT Non-Billable Time (min): 15 min  Total Timed Code Minutes- PT: 38 minute(s)  Therapy Suggested Charges     Code   Minutes Charges    None           Therapy Charges for Today     Code Description Service Date Service Provider Modifiers Qty    85844620951 HC PT THER PROC EA 15 MIN 12/27/2018 Shirley Chopra, ALLEN GP 3    57506149745 HC PT THER SUPP EA 15 MIN 12/27/2018 Shirley Chopra, PTA GP 1                    Shirley Chopra PTA  12/28/2018

## 2018-12-31 ENCOUNTER — HOSPITAL ENCOUNTER (OUTPATIENT)
Dept: PHYSICAL THERAPY | Facility: HOSPITAL | Age: 58
Setting detail: THERAPIES SERIES
Discharge: HOME | End: 2018-12-31

## 2018-12-31 DIAGNOSIS — Z96.641 STATUS POST TOTAL REPLACEMENT OF RIGHT HIP: Primary | ICD-10-CM

## 2018-12-31 PROCEDURE — 97110 THERAPEUTIC EXERCISES: CPT | Performed by: PHYSICAL THERAPIST

## 2019-01-02 ENCOUNTER — APPOINTMENT (OUTPATIENT)
Dept: PHYSICAL THERAPY | Facility: HOSPITAL | Age: 59
End: 2019-01-02

## 2019-01-03 ENCOUNTER — APPOINTMENT (OUTPATIENT)
Dept: PHYSICAL THERAPY | Facility: HOSPITAL | Age: 59
End: 2019-01-03

## 2019-04-16 DIAGNOSIS — I73.9 PVD (PERIPHERAL VASCULAR DISEASE) WITH CLAUDICATION (HCC): Primary | ICD-10-CM

## 2019-07-15 ENCOUNTER — OFFICE VISIT (OUTPATIENT)
Dept: CARDIOLOGY | Facility: CLINIC | Age: 59
End: 2019-07-15

## 2019-07-15 VITALS
SYSTOLIC BLOOD PRESSURE: 168 MMHG | OXYGEN SATURATION: 98 % | WEIGHT: 210 LBS | HEIGHT: 67 IN | HEART RATE: 58 BPM | BODY MASS INDEX: 32.96 KG/M2 | DIASTOLIC BLOOD PRESSURE: 98 MMHG

## 2019-07-15 DIAGNOSIS — I10 ESSENTIAL HYPERTENSION: ICD-10-CM

## 2019-07-15 DIAGNOSIS — E78.00 PURE HYPERCHOLESTEROLEMIA: ICD-10-CM

## 2019-07-15 DIAGNOSIS — Z72.0 NICOTINE ABUSE: ICD-10-CM

## 2019-07-15 DIAGNOSIS — I25.810 CORONARY ARTERY DISEASE INVOLVING CORONARY BYPASS GRAFT OF NATIVE HEART WITHOUT ANGINA PECTORIS: Primary | ICD-10-CM

## 2019-07-15 DIAGNOSIS — I73.9 PVD (PERIPHERAL VASCULAR DISEASE) WITH CLAUDICATION (HCC): ICD-10-CM

## 2019-07-15 PROCEDURE — 99214 OFFICE O/P EST MOD 30 MIN: CPT | Performed by: INTERNAL MEDICINE

## 2019-07-15 RX ORDER — CLOPIDOGREL BISULFATE 75 MG/1
75 TABLET ORAL DAILY
Qty: 90 TABLET | Refills: 5 | Status: SHIPPED | OUTPATIENT
Start: 2019-07-15 | End: 2020-09-11 | Stop reason: SDUPTHER

## 2019-07-15 RX ORDER — AMLODIPINE BESYLATE 5 MG/1
5 TABLET ORAL
Qty: 90 TABLET | Refills: 5 | Status: SHIPPED | OUTPATIENT
Start: 2019-07-15 | End: 2021-02-01 | Stop reason: SDUPTHER

## 2019-07-15 NOTE — PROGRESS NOTES
"  [facility] Cardiology  OFFICE NOTE    Nikhil Hernadez  58 y.o. male    07/15/2019  1. Coronary artery disease involving coronary bypass graft of native heart without angina pectoris    2. PVD (peripheral vascular disease) with claudication (CMS/HCC)    3. Essential hypertension    4. Pure hypercholesterolemia    5. Nicotine abuse        Chief complaint -CAD status post CABG, PVD    History of present Illness- 58-year-old gentleman was history of coronary disease status post two-vessel bypass in 1995 at [facility] in the setting of an acute MI , He smokes about a pack-a-day for many years, and he drinks pretty heavy. He had total occlusion of the left SFA which was treated with \"atherectomy and drug-coated balloon.  He is on Plavix and aspirin.  He had right hip replacement surgery and is doing okay.  We will do a EDGARDO to assess the left SFA.            Allergies   Allergen Reactions   • Aleve [Naproxen Sodium] Itching, Swelling and Rash   • Morphine And Related Nausea And Vomiting         Past Medical History:   Diagnosis Date   • Gallstones    • Hyperlipidemia    • Hypertension    • Myocardial infarction (CMS/HCC) 1994         Past Surgical History:   Procedure Laterality Date   • ARTERIAL BYPASS SURGERY  1995   • CORONARY ARTERY BYPASS GRAFT  1995   • INTERVENTIONAL RADIOLOGY PROCEDURE N/A 10/2/2018    Procedure: Abdominal Aortogram;  Surgeon: Dave Garza MD;  Location: [facility] INVASIVE LOCATION;  Service: Cardiology   • TOTAL HIP ARTHROPLASTY Right 12/03/2018         Family History   Problem Relation Age of Onset   • Cancer Mother    • Cancer Father          Social History     Socioeconomic History   • Marital status:      Spouse name: Not on file   • Number of children: Not on file   • Years of education: Not on file   • Highest education level: Not on file   Tobacco Use   • Smoking status: Current Every Day Smoker     Packs/day: 1.00     Types: Cigarettes   • " "Smokeless tobacco: Never Used   Substance and Sexual Activity   • Alcohol use: Yes     Alcohol/week: 9.0 oz     Types: 15 Shots of liquor per week   • Drug use: No   • Sexual activity: Defer         Current Outpatient Medications   Medication Sig Dispense Refill   • amLODIPine (NORVASC) 5 MG tablet Take 1 tablet by mouth Daily. 90 tablet 5   • aspirin 81 MG chewable tablet Chew 81 mg Daily.     • atorvastatin (LIPITOR) 20 MG tablet Take 20 mg by mouth Every Night.     • cetirizine (zyrTEC) 10 MG tablet Take 10 mg by mouth Daily.     • clopidogrel (PLAVIX) 75 MG tablet Take 1 tablet by mouth Daily. 90 tablet 5   • lisinopril (PRINIVIL,ZESTRIL) 20 MG tablet Take 20 mg by mouth Every Night.     • TOPROL XL 50 MG 24 hr tablet Take 50 mg by mouth Every Night.       No current facility-administered medications for this visit.          Review of Systems     Constitution: Denies any fatigue, fever or chills    HENT: Denies any headache, hearing impairment,     Eyes: Denies any blurring of vision, or photophobia     Cardivascular - As per history of present illness     Respiratory system-  COPD,  shortness of breath     Endocrine:   history of hyperlipidemia,                       Musculoskeletal:  Significant arthritis of the hips    Gastrointestinal: No nausea, vomiting, or melena    Genitourinary: No dysuria or hematuria    Neurological:   No history of seizure disorder, stroke, memory problems    Psychiatric/Behavioral:        No history of depression    Hematological- no history of easy bruising or any bleeding diathesis            OBJECTIVE    /98   Pulse 58   Ht 170.2 cm (67\")   Wt 95.3 kg (210 lb)   SpO2 98%   BMI 32.89 kg/m²       Physical Exam     Constitutional: is oriented to person, place, and time.     Skin-warm and dry    Well developed and nourished in no acute distress      Head: Normocephalic and atraumatic.     Eyes: Pupils are equal    Neck: Neck supple. No bruit in the " carotids    Cardiovascular: [place] in the fifth intercostal space   Regular rate, and  Rhythm,    S1 greater than S2, no S3 or S4, no gallop     Pulmonary/Chest:   Air  Entry is equal on both sides  Scattered rhonchi at the base on both sides      Abdominal: Soft.  No hepatosplenomegaly, bowel sounds are present    Musculoskeletal: No kyphoscoliosis, no significant thickening of the joints    Neurological: is alert and oriented to person, place, and time.    cranial nerve are intact .   No motor or sensory deficit    Extremities-no edema, no radial femoral delay      Psychiatric: He has a normal mood and affect.                  His behavior is normal.           Procedures      A/P    CAD status post CABG with 2 vessel bypass in 1995  with long-standing history of tobacco use and alcohol abuse.  Stress test showed  intermediate risk study and no chest pain.  Continue medical therapy and risk factor modification    Peripheral vascular disease -status post PTA to the left SFA and will continue aspirin and Plavix.  We'll repeat another EDGARDO .    Hypertension/hyperlipidemia on atorvastatin and lisinopril with Toprol-XL.    Tobacco abuse/EtOH abuse-needs risk factor modification.    Follow-up in 6  Months  with Dr. Barnard            This document has been electronically signed by Dave Garza MD on July 15, 2019 9:50 AM       EMR Dragon/Transcription disclaimer:   Some of this note may be an electronic transcription/translation of spoken language to printed text. The electronic translation of spoken language may permit erroneous, or at times, nonsensical words or phrases to be inadvertently transcribed; Although I have reviewed the note for such errors, some may still exist.

## 2019-07-29 ENCOUNTER — TELEPHONE (OUTPATIENT)
Dept: CARDIOLOGY | Facility: CLINIC | Age: 59
End: 2019-07-29

## 2020-01-20 ENCOUNTER — OFFICE VISIT (OUTPATIENT)
Dept: CARDIOLOGY | Facility: CLINIC | Age: 60
End: 2020-01-20

## 2020-01-20 VITALS
OXYGEN SATURATION: 97 % | DIASTOLIC BLOOD PRESSURE: 92 MMHG | WEIGHT: 210 LBS | HEIGHT: 67 IN | BODY MASS INDEX: 32.96 KG/M2 | SYSTOLIC BLOOD PRESSURE: 160 MMHG | HEART RATE: 63 BPM

## 2020-01-20 DIAGNOSIS — I73.9 PVD (PERIPHERAL VASCULAR DISEASE) WITH CLAUDICATION (HCC): ICD-10-CM

## 2020-01-20 DIAGNOSIS — E78.00 PURE HYPERCHOLESTEROLEMIA: ICD-10-CM

## 2020-01-20 DIAGNOSIS — I10 ESSENTIAL HYPERTENSION: Primary | ICD-10-CM

## 2020-01-20 DIAGNOSIS — I25.810 CORONARY ARTERY DISEASE INVOLVING CORONARY BYPASS GRAFT OF NATIVE HEART WITHOUT ANGINA PECTORIS: Primary | ICD-10-CM

## 2020-01-20 PROCEDURE — 99214 OFFICE O/P EST MOD 30 MIN: CPT | Performed by: INTERNAL MEDICINE

## 2020-01-20 PROCEDURE — 93000 ELECTROCARDIOGRAM COMPLETE: CPT | Performed by: INTERNAL MEDICINE

## 2020-01-20 NOTE — PROGRESS NOTES
"  [facility] Cardiology  OFFICE NOTE    Cardiovascular Medicine  Matti Barnard M.D., RPVI         No referring provider defined for this encounter.    Thank you for asking me to see Nikhil Hernadez for CAD.    History of Present Illness  This is a 59 y.o. male with:    1. Coronary artery disease involving coronary bypass graft of native heart without angina pectoris    2. PVD (peripheral vascular disease) with claudication (CMS/[facility])    3. Essential hypertension    4. Pure hypercholesterolemia    5. Nicotine abuse          Chief complaint -CAD status post CABG, PVD     History of present Illness- 59-year-old gentleman was history of coronary disease status post two-vessel bypass in 1995 at [facility] in the setting of an acute MI , He smokes about a pack-a-day for many years, and he drinks pretty heavy. He had total occlusion of the left SFA which was treated with \"atherectomy and drug-coated balloon by Dr. Acosta.  He is on Plavix and aspirin.     No acute issues since last visit.  Continues to remain chest pain-free.  Denying any claudication.  Has been compliant with his aspirin Plavix without any bleeding problems.  No other acute complaints.      Review of Systems - ROS  Constitution: Negative for weakness, weight gain and weight loss.   HENT: Negative for congestion.    Eyes: Negative for blurred vision.   Cardiovascular: As mentioned above  Respiratory: Negative for cough and hemoptysis.    Endocrine: Negative for polydipsia and polyuria.   Hematologic/Lymphatic: Negative for bleeding problem. Does not bruise/bleed easily.   Skin: Negative for flushing.   Musculoskeletal: Negative for neck pain and stiffness.   Gastrointestinal: Negative for abdominal pain, diarrhea, jaundice, melena, nausea and vomiting.   Genitourinary: Negative for dysuria and hematuria.   Neurological: Negative for dizziness, focal weakness and numbness.   Psychiatric/Behavioral: Negative for altered mental status " "and depression.          All other systems were reviewed and were negative.    family history includes Cancer in his father and mother.     reports that he has been smoking cigarettes. He has been smoking about 1.00 pack per day. He has never used smokeless tobacco. He reports that he drinks about 15.0 standard drinks of alcohol per week. He reports that he does not use drugs.    Allergies   Allergen Reactions   • Aleve [Naproxen Sodium] Itching, Swelling and Rash   • Morphine And Related Nausea And Vomiting         Current Outpatient Medications:   •  amLODIPine (NORVASC) 5 MG tablet, Take 1 tablet by mouth Daily., Disp: 90 tablet, Rfl: 5  •  aspirin 81 MG chewable tablet, Chew 81 mg Daily., Disp: , Rfl:   •  atorvastatin (LIPITOR) 20 MG tablet, Take 20 mg by mouth Every Night., Disp: , Rfl:   •  cetirizine (zyrTEC) 10 MG tablet, Take 10 mg by mouth As Needed., Disp: , Rfl:   •  clopidogrel (PLAVIX) 75 MG tablet, Take 1 tablet by mouth Daily., Disp: 90 tablet, Rfl: 5  •  lisinopril (PRINIVIL,ZESTRIL) 20 MG tablet, Take 20 mg by mouth Every Night., Disp: , Rfl:   •  TOPROL XL 50 MG 24 hr tablet, Take 50 mg by mouth Every Night., Disp: , Rfl:     Physical Exam:  Vitals:    01/20/20 0903   BP: 160/92   BP Location: Left arm   Patient Position: Sitting   Cuff Size: Adult   Pulse: 63   SpO2: 97%   Weight: 95.3 kg (210 lb)   Height: 170.2 cm (67.01\")   PainSc: 0-No pain     Current Pain Level: none  Pulse Ox: Normal  on room air  General: alert, appears stated age and cooperative     Body Habitus: well-nourished    HEENT: Head: Normocephalic, no lesions, without obvious abnormality. No arcus senilis, xanthelasma or xanthomas.    Neuro: alert, oriented x3  Pulses: 2+ and symmetric  JVP: Volume/Pulsation: Normal.  Normal waveforms.   Appropriate inspiratory decrease.  No Kussmaul's. No Laurel's.   Carotid Exam: no bruit normal pulsation bilaterally   Carotid Volume: normal.     Respirations: no increased work of " breathing   Chest:  Normal    Pulmonary:Normal   Precordium: Normal impulses. P2 is not palpable.  RV Heave: absent  LV Heave: absent  [place]:  normal size and placement  Palpable S4: absent.  Heart rate: normal    Heart Rhythm: regular     Heart Sounds: S1: normal  S2: normal  S3: absent   S4: absent  Opening Snap: absent    Pericardial Rub:  Absent: .    Abdomen:   Appearance: normal .  Palpation: Soft, non-tender to palpation, bowel sounds positive in all four quadrants; no guarding or rebound tenderness  Extremity: no edema.   LE Skin: no rashes  LE Hair:  normal  LE Pulses: well perfused with normal pulses in the distal extremities  Pallor on elevation: Absent. Rubor on dependency: None      DATA REVIEWED:     EKG. I personally reviewed and interpreted the EKG.  Normal sinus rhythm.  Nonspecific ST-T changes.    ECG/EMG Results (all)     None        ---------------------------------------------------  TTE/ASHTYN:  Results for orders placed in visit on 08/17/18   Adult Transthoracic Echo Complete W/ Cont if Necessary Per Protocol    Narrative · The left ventricular cavity is mildly dilated.  · Left ventricular systolic function is normal. Estimated EF = 51%.  · Left ventricular diastolic dysfunction (grade I) consistent with   impaired relaxation.  · Left atrial cavity size is mildly dilated.  · The tricuspid valve is grossly normal. Trace tricuspid valve   regurgitation is present. Estimated right ventricular systolic pressure   from tricuspid regurgitation is normal (<35 mmHg). No evidence of   pulmonary hypertension is present.            --------------------------------------------------------------------------------------------------  LABS:     The CVD Risk score (Yonatan et al., 2008) failed to calculate for the following reasons:    The patient has a prior MI, stroke, CHF, or peripheral vascular disease diagnosis         Lab Results   Component Value Date    GLUCOSE 94 10/03/2018    BUN 8 10/03/2018     CREATININE 0.70 10/03/2018    EGFRIFNONA 115 10/03/2018    BCR 11.4 10/03/2018    K 3.9 10/03/2018    CO2 25.0 10/03/2018    CALCIUM 8.2 (L) 10/03/2018    ALBUMIN 3.90 09/11/2018    AST 52 09/11/2018    ALT 62 09/11/2018     Lab Results   Component Value Date    WBC 4.99 10/03/2018    HGB 13.3 (L) 10/03/2018    HCT 38.8 (L) 10/03/2018    MCV 91.3 10/03/2018     (L) 10/03/2018     Lab Results   Component Value Date    CHOL 102 10/03/2018    TRIG 59 10/03/2018    HDL 38 (L) 10/03/2018    LDL 42 10/03/2018     No results found for: TSH, Y6SORFP, R2KRYOZ, THYROIDAB  No results found for: CKTOTAL, CKMB, CKMBINDEX, TROPONINI, TROPONINT  No results found for: HGBA1C  No results found for: DDIMER  Lab Results   Component Value Date    ALT 62 09/11/2018     No results found for: HGBA1C  Lab Results   Component Value Date    CREATININE 0.70 10/03/2018     No results found for: IRON, TIBC, FERRITIN  Lab Results   Component Value Date    INR 1.04 10/02/2018    PROTIME 13.4 10/02/2018       Assessment/Plan     CAD status post CABG with 2 vessel bypass in 1995  with long-standing history of tobacco use and alcohol abuse.  Stress test showed  intermediate risk study with small to medium ischemia in the inferior wall in 2018.  However patient has been asymptomatic.  Has been treated with conservative therapy.  We will continue medical management for now.  If patient were to have symptoms of chest pain and will consider cardiac cath for further evaluation.  Continue aspirin Plavix.      Peripheral vascular disease -status post PTA to the left SFA and will continue aspirin and Plavix.  ABIs were normal 6 months ago.      Hypertension/hyperlipidemia on atorvastatin and lisinopril with Toprol-XL and amlodipine.  Blood pressure running on the higher side in office today however patient reported that he has known whitecoat hypertension.  His blood pressure is staying under less than 130 at home most of the time.     Tobacco  abuse/EtOH abuse-needs risk factor modification.         Prevention:  Patient's Body mass index is 32.88 kg/m². BMI is above normal parameters. Recommendations include: exercise counseling and nutrition counseling.      Nikhil Hernadez  reports that he has been smoking cigarettes. He has been smoking about 1.00 pack per day. He has never used smokeless tobacco.. I have educated him on the risk of diseases from using tobacco products such as cancer, COPD and heart diease.     I advised him to quit and he is willing to quit. We have discussed the following method/s for tobacco cessation:  Counseling.      I spent 3  minutes counseling the patient.           AAA Screening:   Check after he turned 63.    Return in about 1 year (around 1/20/2021).          This document has been electronically signed by Matti Barnard MD on January 20, 2020 9:21 AM

## 2020-09-11 ENCOUNTER — TELEPHONE (OUTPATIENT)
Dept: CARDIOLOGY | Facility: CLINIC | Age: 60
End: 2020-09-11

## 2020-09-11 RX ORDER — CLOPIDOGREL BISULFATE 75 MG/1
75 TABLET ORAL DAILY
Qty: 30 TABLET | Refills: 0 | Status: SHIPPED | OUTPATIENT
Start: 2020-09-11 | End: 2021-02-01

## 2020-09-11 NOTE — TELEPHONE ENCOUNTER
Last script for Plavix 75 mg tablets  for a 18 month supply was sent in on 7/15/19 by Dr Garza with sig of Take 1 tablet by mouth daily.    Pt was last seen on 1/20/20 and has an appt scheduled for 12/7/20    The last visit note from Dr Barnard says to continue Plavix.    Sent in refill request.

## 2020-09-11 NOTE — TELEPHONE ENCOUNTER
----- Message from Lanette Hsieh sent at 9/11/2020  8:10 AM CDT -----  Contact: [phone]  Pt called requesting a refill of clopidogrel (PLAVIX) 75 MG tablet sent to [facility] [place]. He is completely out. After this script he will want it called into [facility] so he can get a 90 day supply.

## 2021-02-01 ENCOUNTER — OFFICE VISIT (OUTPATIENT)
Dept: CARDIOLOGY | Facility: CLINIC | Age: 61
End: 2021-02-01

## 2021-02-01 VITALS
HEART RATE: 67 BPM | WEIGHT: 217 LBS | DIASTOLIC BLOOD PRESSURE: 82 MMHG | BODY MASS INDEX: 34.06 KG/M2 | HEIGHT: 67 IN | SYSTOLIC BLOOD PRESSURE: 150 MMHG | OXYGEN SATURATION: 96 %

## 2021-02-01 DIAGNOSIS — I25.810 CORONARY ARTERY DISEASE INVOLVING CORONARY BYPASS GRAFT OF NATIVE HEART WITHOUT ANGINA PECTORIS: Primary | ICD-10-CM

## 2021-02-01 PROCEDURE — 99214 OFFICE O/P EST MOD 30 MIN: CPT | Performed by: INTERNAL MEDICINE

## 2021-02-01 PROCEDURE — 93000 ELECTROCARDIOGRAM COMPLETE: CPT | Performed by: INTERNAL MEDICINE

## 2021-02-01 RX ORDER — AMLODIPINE BESYLATE 5 MG/1
5 TABLET ORAL
Qty: 90 TABLET | Refills: 3 | Status: SHIPPED | OUTPATIENT
Start: 2021-02-01

## 2021-02-01 RX ORDER — CLOPIDOGREL BISULFATE 75 MG/1
75 TABLET ORAL DAILY
Qty: 90 TABLET | Refills: 3 | Status: SHIPPED | OUTPATIENT
Start: 2021-02-01 | End: 2022-05-09 | Stop reason: SDUPTHER

## 2021-02-01 NOTE — PROGRESS NOTES
"  [facility] Cardiology  OFFICE NOTE    Cardiovascular Medicine  Matti Barnard M.D., RPVI         No referring provider defined for this encounter.    Thank you for asking me to see Nikhil Hernadez for CAD.    History of Present Illness  This is a 60 y.o. male with:    1. Coronary artery disease involving coronary bypass graft of native heart without angina pectoris    2. PVD (peripheral vascular disease) with claudication (CMS/[facility])    3. Essential hypertension    4. Pure hypercholesterolemia    5. Nicotine abuse          Chief complaint -CAD status post CABG, PVD     History of present Illness- 60-year-old gentleman was history of coronary disease status post two-vessel bypass in 1995 at [facility] in the setting of an acute MI , He smokes about a pack-a-day for many years, and he drinks pretty heavy. He had total occlusion of the left SFA which was treated with \"atherectomy and drug-coated balloon by Dr. Acosta.  He is on Plavix and aspirin.     02/01/2021  No acute issues since last visit.  Continues to remain chest pain-free.  Denying any claudication.  Has been compliant with his aspirin Plavix without any bleeding problems.  No other acute complaints.  He has been pretty active at work and can climb 5 flights of stairs without any limitations from chest pain or claudications.      Review of Systems - ROS  Constitution: Negative for weakness, weight gain and weight loss.   HENT: Negative for congestion.    Eyes: Negative for blurred vision.   Cardiovascular: As mentioned above  Respiratory: Negative for cough and hemoptysis.    Endocrine: Negative for polydipsia and polyuria.   Hematologic/Lymphatic: Negative for bleeding problem. Does not bruise/bleed easily.   Skin: Negative for flushing.   Musculoskeletal: Negative for neck pain and stiffness.   Gastrointestinal: Negative for abdominal pain, diarrhea, jaundice, melena, nausea and vomiting.   Genitourinary: Negative for dysuria and " "hematuria.   Neurological: Negative for dizziness, focal weakness and numbness.   Psychiatric/Behavioral: Negative for altered mental status and depression.          All other systems were reviewed and were negative.    family history includes Cancer in his father and mother.     reports that he has been smoking cigarettes. He has been smoking about 1.00 pack per day. He has never used smokeless tobacco. He reports current alcohol use of about 15.0 standard drinks of alcohol per week. He reports that he does not use drugs.    Allergies   Allergen Reactions   • Aleve [Naproxen Sodium] Itching, Swelling and Rash   • Morphine And Related Nausea And Vomiting         Current Outpatient Medications:   •  amLODIPine (NORVASC) 5 MG tablet, Take 1 tablet by mouth Daily., Disp: 90 tablet, Rfl: 5  •  aspirin 81 MG chewable tablet, Chew 81 mg Daily., Disp: , Rfl:   •  atorvastatin (LIPITOR) 20 MG tablet, Take 20 mg by mouth Every Night., Disp: , Rfl:   •  cetirizine (zyrTEC) 10 MG tablet, Take 10 mg by mouth As Needed., Disp: , Rfl:   •  clopidogrel (PLAVIX) 75 MG tablet, Take 1 tablet by mouth Daily., Disp: 30 tablet, Rfl: 0  •  lisinopril (PRINIVIL,ZESTRIL) 20 MG tablet, Take 20 mg by mouth Every Night., Disp: , Rfl:   •  TOPROL XL 50 MG 24 hr tablet, Take 50 mg by mouth Every Night., Disp: , Rfl:     Physical Exam:  Vitals:    02/01/21 1436   BP: 150/82   Pulse: 67   SpO2: 96%   Weight: 98.4 kg (217 lb)   Height: 170.2 cm (67\")   PainSc: 0-No pain     Current Pain Level: none  Pulse Ox: Normal  on room air  General: alert, appears stated age and cooperative     Body Habitus: well-nourished    HEENT: Head: Normocephalic, no lesions, without obvious abnormality. No arcus senilis, xanthelasma or xanthomas.    Neuro: alert, oriented x3  Pulses: 2+ and symmetric  JVP: Volume/Pulsation: Normal.  Normal waveforms.   Appropriate inspiratory decrease.  No Kussmaul's. No Laurel's.   Carotid Exam: no bruit normal pulsation " bilaterally   Carotid Volume: normal.     Respirations: no increased work of breathing   Chest:  Normal    Pulmonary:Normal   Precordium: Normal impulses. P2 is not palpable.  RV Heave: absent  LV Heave: absent  [place]:  normal size and placement  Palpable S4: absent.  Heart rate: normal    Heart Rhythm: regular     Heart Sounds: S1: normal  S2: normal  S3: absent   S4: absent  Opening Snap: absent    Pericardial Rub:  Absent: .    Abdomen:   Appearance: normal .  Palpation: Soft, non-tender to palpation, bowel sounds positive in all four quadrants; no guarding or rebound tenderness  Extremity: no edema.   LE Skin: no rashes  LE Hair:  normal  LE Pulses: well perfused with normal pulses in the distal extremities  Pallor on elevation: Absent. Rubor on dependency: None      DATA REVIEWED:     EKG. I personally reviewed and interpreted the EKG.  Normal sinus rhythm.  Nonspecific ST-T changes.    ECG/EMG Results (all)     None        ---------------------------------------------------  TTE/ASHTYN:  Results for orders placed in visit on 08/17/18   Adult Transthoracic Echo Complete W/ Cont if Necessary Per Protocol    Narrative · The left ventricular cavity is mildly dilated.  · Left ventricular systolic function is normal. Estimated EF = 51%.  · Left ventricular diastolic dysfunction (grade I) consistent with   impaired relaxation.  · Left atrial cavity size is mildly dilated.  · The tricuspid valve is grossly normal. Trace tricuspid valve   regurgitation is present. Estimated right ventricular systolic pressure   from tricuspid regurgitation is normal (<35 mmHg). No evidence of   pulmonary hypertension is present.            --------------------------------------------------------------------------------------------------  LABS:     The CVD Risk score (Yonatan et al., 2008) failed to calculate for the following reasons:    The patient has a prior MI, stroke, CHF, or peripheral vascular disease diagnosis         Lab  Results   Component Value Date    GLUCOSE 94 10/03/2018    BUN 8 10/03/2018    CREATININE 0.70 10/03/2018    EGFRIFNONA 115 10/03/2018    BCR 11.4 10/03/2018    K 3.9 10/03/2018    CO2 25.0 10/03/2018    CALCIUM 8.2 (L) 10/03/2018    ALBUMIN 3.90 09/11/2018    AST 52 09/11/2018    ALT 62 09/11/2018     Lab Results   Component Value Date    WBC 4.99 10/03/2018    HGB 13.3 (L) 10/03/2018    HCT 38.8 (L) 10/03/2018    MCV 91.3 10/03/2018     (L) 10/03/2018     Lab Results   Component Value Date    CHOL 102 10/03/2018    TRIG 59 10/03/2018    HDL 38 (L) 10/03/2018    LDL 42 10/03/2018     No results found for: TSH, M6HRXHG, U0ZBVZD, THYROIDAB  No results found for: CKTOTAL, CKMB, CKMBINDEX, TROPONINI, TROPONINT  No results found for: HGBA1C  No results found for: DDIMER  Lab Results   Component Value Date    ALT 62 09/11/2018     No results found for: HGBA1C  Lab Results   Component Value Date    CREATININE 0.70 10/03/2018     No results found for: IRON, TIBC, FERRITIN  Lab Results   Component Value Date    INR 1.04 10/02/2018    PROTIME 13.4 10/02/2018       Assessment/Plan     CAD status post CABG with 2 vessel bypass in 1995  with long-standing history of tobacco use and alcohol abuse.  Stress test showed  intermediate risk study with small to medium ischemia in the inferior wall in 2018.  However patient has been asymptomatic.  Has been treated with conservative therapy.  We will continue medical management for now.  If patient were to have symptoms of chest pain and will consider cardiac cath for further evaluation.  Continue aspirin Plavix.      Peripheral vascular disease -status post PTA to the left SFA and will continue aspirin and Plavix.  ABIs were normal last time.    Hypertension/hyperlipidemia on atorvastatin and lisinopril with Toprol-XL and amlodipine.  Blood pressure running on the higher side but he has not been taking his amlodipine.  He will start taking his amlodipine 5 mg.     Tobacco  abuse/EtOH abuse-needs risk factor modification.         Prevention:  Patient's Body mass index is 33.99 kg/m². BMI is above normal parameters. Recommendations include: exercise counseling and nutrition counseling.      Nikhil Hernadez  reports that he has been smoking cigarettes. He has been smoking about 1.00 pack per day. He has never used smokeless tobacco.. I have educated him on the risk of diseases from using tobacco products such as cancer, COPD and heart diease.     I advised him to quit and he is willing to quit. We have discussed the following method/s for tobacco cessation:  Counseling.      I spent 3  minutes counseling the patient.           AAA Screening:   Check after he turned 63.            This document has been electronically signed by Matti Barnard MD on February 1, 2021 14:49 CST

## 2021-02-04 LAB
QT INTERVAL: 436 MS
QTC INTERVAL: 460 MS

## 2021-12-13 ENCOUNTER — OFFICE VISIT (OUTPATIENT)
Dept: CARDIOLOGY | Facility: CLINIC | Age: 61
End: 2021-12-13

## 2021-12-13 VITALS
SYSTOLIC BLOOD PRESSURE: 174 MMHG | HEART RATE: 55 BPM | OXYGEN SATURATION: 97 % | BODY MASS INDEX: 32.83 KG/M2 | DIASTOLIC BLOOD PRESSURE: 94 MMHG | WEIGHT: 209.2 LBS | HEIGHT: 67 IN

## 2021-12-13 DIAGNOSIS — I73.9 PVD (PERIPHERAL VASCULAR DISEASE) WITH CLAUDICATION (HCC): ICD-10-CM

## 2021-12-13 DIAGNOSIS — I25.810 CORONARY ARTERY DISEASE INVOLVING CORONARY BYPASS GRAFT OF NATIVE HEART WITHOUT ANGINA PECTORIS: Primary | ICD-10-CM

## 2021-12-13 PROCEDURE — 99214 OFFICE O/P EST MOD 30 MIN: CPT | Performed by: INTERNAL MEDICINE

## 2021-12-13 PROCEDURE — 93000 ELECTROCARDIOGRAM COMPLETE: CPT | Performed by: INTERNAL MEDICINE

## 2021-12-13 NOTE — PROGRESS NOTES
"  [facility]  OFFICE NOTE    Cardiovascular Medicine  Matti Barnard M.D., RPVI         No referring provider defined for this encounter.    Thank you for asking me to see Nikhil Hernadez for CAD.    History of Present Illness  This is a 61 y.o. male with:    1. Coronary artery disease involving coronary bypass graft of native heart without angina pectoris    2. PVD (peripheral vascular disease) with claudication (CMS/[facility])    3. Essential hypertension    4. Pure hypercholesterolemia    5. Nicotine abuse          Chief complaint -CAD status post CABG, PVD     History of present Illness- 61 y.o.-year-old gentleman was history of coronary disease status post two-vessel bypass in 1995 at [facility] in the setting of an acute MI , He smokes about a pack-a-day for many years, and he drinks pretty heavy. He had total occlusion of the left SFA which was treated with \"atherectomy and drug-coated balloon by Dr. Acosta.  He is on Plavix and aspirin.     02/01/2021  No acute issues since last visit.  Continues to remain chest pain-free.  Denying any claudication.  Has been compliant with his aspirin Plavix without any bleeding problems.  No other acute complaints.  He has been pretty active at work and can climb 5 flights of stairs without any limitations from chest pain or claudications.    12/13/2021:  No acute issue since last visit but denying any chest pain or shortness of breath.  Has been taking aspirin Plavix without any bleeding problems.  Reported blood pressures well controlled at home.        Review of Systems - ROS  Constitution: Negative for weakness, weight gain and weight loss.   HENT: Negative for congestion.    Eyes: Negative for blurred vision.   Cardiovascular: As mentioned above  Respiratory: Negative for cough and hemoptysis.    Endocrine: Negative for polydipsia and polyuria.   Hematologic/Lymphatic: Negative for bleeding problem. Does not bruise/bleed easily.   Skin: " "Negative for flushing.   Musculoskeletal: Negative for neck pain and stiffness.   Gastrointestinal: Negative for abdominal pain, diarrhea, jaundice, melena, nausea and vomiting.   Genitourinary: Negative for dysuria and hematuria.   Neurological: Negative for dizziness, focal weakness and numbness.   Psychiatric/Behavioral: Negative for altered mental status and depression.          All other systems were reviewed and were negative.    family history includes Cancer in his father and mother.     reports that he has been smoking cigarettes. He has been smoking about 1.00 pack per day. He has never used smokeless tobacco. He reports current alcohol use of about 15.0 standard drinks of alcohol per week. He reports that he does not use drugs.    Allergies   Allergen Reactions   • Aleve [Naproxen Sodium] Itching, Swelling and Rash   • Morphine And Related Nausea And Vomiting         Current Outpatient Medications:   •  amLODIPine (NORVASC) 5 MG tablet, Take 1 tablet by mouth Daily., Disp: 90 tablet, Rfl: 3  •  aspirin 81 MG chewable tablet, Chew 81 mg Daily., Disp: , Rfl:   •  atorvastatin (LIPITOR) 20 MG tablet, Take 20 mg by mouth Every Night., Disp: , Rfl:   •  cetirizine (zyrTEC) 10 MG tablet, Take 10 mg by mouth As Needed., Disp: , Rfl:   •  clopidogrel (PLAVIX) 75 MG tablet, Take 1 tablet by mouth Daily., Disp: 90 tablet, Rfl: 3  •  lisinopril (PRINIVIL,ZESTRIL) 20 MG tablet, Take 20 mg by mouth Every Night., Disp: , Rfl:   •  TOPROL XL 50 MG 24 hr tablet, Take 50 mg by mouth Every Night., Disp: , Rfl:     Physical Exam:  Vitals:    12/13/21 1118   BP: 174/94   BP Location: Left arm   Patient Position: Sitting   Cuff Size: Adult   Pulse: 55   SpO2: 97%   Weight: 94.9 kg (209 lb 3.2 oz)   Height: 170.2 cm (67\")   PainSc: 0-No pain     Current Pain Level: none  Pulse Ox: Normal  on room air  General: alert, appears stated age and cooperative     Body Habitus: well-nourished    HEENT: Head: Normocephalic, no lesions, " without obvious abnormality. No arcus senilis, xanthelasma or xanthomas.    Neuro: alert, oriented x3  Pulses: 2+ and symmetric  JVP: Volume/Pulsation: Normal.  Normal waveforms.   Appropriate inspiratory decrease.  No Kussmaul's. No Laurel's.   Carotid Exam: no bruit normal pulsation bilaterally   Carotid Volume: normal.     Respirations: no increased work of breathing   Chest:  Normal    Pulmonary:Normal   Precordium: Normal impulses. P2 is not palpable.  RV Heave: absent  LV Heave: absent  [place]:  normal size and placement  Palpable S4: absent.  Heart rate: normal    Heart Rhythm: regular     Heart Sounds: S1: normal  S2: normal  S3: absent   S4: absent  Opening Snap: absent    Pericardial Rub:  Absent: .    Abdomen:   Appearance: normal .  Palpation: Soft, non-tender to palpation, bowel sounds positive in all four quadrants; no guarding or rebound tenderness  Extremity: no edema.   LE Skin: no rashes  LE Hair:  normal  LE Pulses: well perfused with normal pulses in the distal extremities  Pallor on elevation: Absent. Rubor on dependency: None      DATA REVIEWED:     EKG. I personally reviewed and interpreted the EKG.  Normal sinus rhythm.  Nonspecific ST-T changes.  Right bundle branch block.    ECG/EMG Results (all)     None        ---------------------------------------------------  TTE/ASHTYN:  Results for orders placed in visit on 08/17/18    Adult Transthoracic Echo Complete W/ Cont if Necessary Per Protocol    Interpretation Summary  · The left ventricular cavity is mildly dilated.  · Left ventricular systolic function is normal. Estimated EF = 51%.  · Left ventricular diastolic dysfunction (grade I) consistent with impaired relaxation.  · Left atrial cavity size is mildly dilated.  · The tricuspid valve is grossly normal. Trace tricuspid valve regurgitation is present. Estimated right ventricular systolic pressure from tricuspid regurgitation is normal (<35 mmHg). No evidence of pulmonary hypertension is  present.        --------------------------------------------------------------------------------------------------  LABS:     The CVD Risk score (Yonatan et al., 2008) failed to calculate for the following reasons:    The patient has a prior MI, stroke, CHF, or peripheral vascular disease diagnosis         Lab Results   Component Value Date    GLUCOSE 94 10/03/2018    BUN 8 10/03/2018    CREATININE 0.70 10/03/2018    EGFRIFNONA 115 10/03/2018    BCR 11.4 10/03/2018    K 3.9 10/03/2018    CO2 25.0 10/03/2018    CALCIUM 8.2 (L) 10/03/2018    ALBUMIN 3.90 09/11/2018    AST 52 09/11/2018    ALT 62 09/11/2018     Lab Results   Component Value Date    WBC 4.99 10/03/2018    HGB 13.3 (L) 10/03/2018    HCT 38.8 (L) 10/03/2018    MCV 91.3 10/03/2018     (L) 10/03/2018     Lab Results   Component Value Date    CHOL 102 10/03/2018    TRIG 59 10/03/2018    HDL 38 (L) 10/03/2018    LDL 42 10/03/2018     No results found for: TSH, P0PJRLQ, N8TCKRY, THYROIDAB  No results found for: CKTOTAL, CKMB, CKMBINDEX, TROPONINI, TROPONINT  No results found for: HGBA1C  No results found for: DDIMER  Lab Results   Component Value Date    ALT 62 09/11/2018     No results found for: HGBA1C  Lab Results   Component Value Date    CREATININE 0.70 10/03/2018     No results found for: IRON, TIBC, FERRITIN  Lab Results   Component Value Date    INR 1.04 10/02/2018    PROTIME 13.4 10/02/2018       Assessment/Plan     CAD status post CABG with 2 vessel bypass in 1995  with long-standing history of tobacco use and alcohol abuse.  Stress test showed  intermediate risk study with small to medium ischemia in the inferior wall in 2018.  However patient has been asymptomatic.  Has been treated with conservative therapy.  We will continue medical management for now.  If patient were to have symptoms of chest pain and will consider cardiac cath for further evaluation.  Continue aspirin Plavix.    Hyperlipidemia: LDL is less than 70 on Lipitor 20 mg.   Will need repeat blood work.    Peripheral vascular disease -status post PTA to the left SFA and will continue aspirin and Plavix.  ABIs 2019, will repeat    Hypertension/hyperlipidemia on atorvastatin and lisinopril with Toprol-XL and amlodipine.  Blood pressure running on the higher side but he had not been taking his amlodipine.  Restart amlodipine last visit, blood pressure is still on the higher side.Tobacco abuse/EtOH abuse-needs risk factor modification.  I did advise to increase amlodipine to 10 mg however he wants to hold off for now he wants to bring his blood pressure log and machine next time for calibration.      Prevention:  Patient's Body mass index is 32.77 kg/m². BMI is above normal parameters. Recommendations include: exercise counseling and nutrition counseling.      Nikhil Hernadez  reports that he has been smoking cigarettes. He has been smoking about 1.00 pack per day. He has never used smokeless tobacco.. I have educated him on the risk of diseases from using tobacco products such as cancer, COPD and heart diease.     I advised him to quit and he is willing to quit. We have discussed the following method/s for tobacco cessation:  Counseling.      I spent 3  minutes counseling the patient.           AAA Screening:   Check after he turned 63.            This document has been electronically signed by Matti Barnard MD on December 13, 2021 11:26 CST

## 2021-12-22 LAB
QT INTERVAL: 492 MS
QTC INTERVAL: 470 MS

## 2021-12-28 LAB
BH CV ECHO MEAS - ACS: 2.3 CM
BH CV ECHO MEAS - AO MAX PG (FULL): 1.7 MMHG
BH CV ECHO MEAS - AO MAX PG: 7.7 MMHG
BH CV ECHO MEAS - AO MEAN PG (FULL): 1 MMHG
BH CV ECHO MEAS - AO MEAN PG: 3 MMHG
BH CV ECHO MEAS - AO ROOT AREA (BSA CORRECTED): 1.5
BH CV ECHO MEAS - AO ROOT AREA: 7.1 CM^2
BH CV ECHO MEAS - AO ROOT DIAM: 3 CM
BH CV ECHO MEAS - AO V2 MAX: 139 CM/SEC
BH CV ECHO MEAS - AO V2 MEAN: 87.1 CM/SEC
BH CV ECHO MEAS - AO V2 VTI: 28.4 CM
BH CV ECHO MEAS - ASC AORTA: 3.7 CM
BH CV ECHO MEAS - AVA(I,A): 4.5 CM^2
BH CV ECHO MEAS - AVA(I,D): 4.5 CM^2
BH CV ECHO MEAS - AVA(V,A): 4 CM^2
BH CV ECHO MEAS - AVA(V,D): 4 CM^2
BH CV ECHO MEAS - BSA(HAYCOCK): 2.2 M^2
BH CV ECHO MEAS - BSA: 2.1 M^2
BH CV ECHO MEAS - BZI_BMI: 32.7 KILOGRAMS/M^2
BH CV ECHO MEAS - BZI_METRIC_HEIGHT: 170.2 CM
BH CV ECHO MEAS - BZI_METRIC_WEIGHT: 94.8 KG
BH CV ECHO MEAS - EDV(CUBED): 182.3 ML
BH CV ECHO MEAS - EDV(MOD-SP2): 101 ML
BH CV ECHO MEAS - EDV(MOD-SP4): 107 ML
BH CV ECHO MEAS - EDV(TEICH): 158.1 ML
BH CV ECHO MEAS - EF(CUBED): 64.4 %
BH CV ECHO MEAS - EF(MOD-SP2): 55.2 %
BH CV ECHO MEAS - EF(MOD-SP4): 55.5 %
BH CV ECHO MEAS - EF(TEICH): 55.2 %
BH CV ECHO MEAS - EPSS: 1.1 CM
BH CV ECHO MEAS - ESV(CUBED): 65 ML
BH CV ECHO MEAS - ESV(MOD-SP2): 45.2 ML
BH CV ECHO MEAS - ESV(MOD-SP4): 47.6 ML
BH CV ECHO MEAS - ESV(TEICH): 70.8 ML
BH CV ECHO MEAS - FS: 29.1 %
BH CV ECHO MEAS - IVS/LVPW: 0.83
BH CV ECHO MEAS - IVSD: 0.96 CM
BH CV ECHO MEAS - LA DIMENSION: 5.2 CM
BH CV ECHO MEAS - LA/AO: 1.7
BH CV ECHO MEAS - LV DIASTOLIC VOL/BSA (35-75): 51.9 ML/M^2
BH CV ECHO MEAS - LV MASS(C)D: 242.9 GRAMS
BH CV ECHO MEAS - LV MASS(C)DI: 117.9 GRAMS/M^2
BH CV ECHO MEAS - LV MAX PG: 6.1 MMHG
BH CV ECHO MEAS - LV MEAN PG: 2 MMHG
BH CV ECHO MEAS - LV SYSTOLIC VOL/BSA (12-30): 23.1 ML/M^2
BH CV ECHO MEAS - LV V1 MAX: 123 CM/SEC
BH CV ECHO MEAS - LV V1 MEAN: 71.1 CM/SEC
BH CV ECHO MEAS - LV V1 VTI: 28.4 CM
BH CV ECHO MEAS - LVIDD: 5.7 CM
BH CV ECHO MEAS - LVIDS: 4 CM
BH CV ECHO MEAS - LVLD AP2: 9.2 CM
BH CV ECHO MEAS - LVLD AP4: 8.8 CM
BH CV ECHO MEAS - LVLS AP2: 7.3 CM
BH CV ECHO MEAS - LVLS AP4: 7.1 CM
BH CV ECHO MEAS - LVOT AREA (M): 4.5 CM^2
BH CV ECHO MEAS - LVOT AREA: 4.5 CM^2
BH CV ECHO MEAS - LVOT DIAM: 2.4 CM
BH CV ECHO MEAS - LVPWD: 1.2 CM
BH CV ECHO MEAS - MR MAX PG: 35.8 MMHG
BH CV ECHO MEAS - MR MAX VEL: 299 CM/SEC
BH CV ECHO MEAS - MV A MAX VEL: 51.8 CM/SEC
BH CV ECHO MEAS - MV DEC SLOPE: 323 CM/SEC^2
BH CV ECHO MEAS - MV DEC TIME: 0.22 SEC
BH CV ECHO MEAS - MV E MAX VEL: 70.3 CM/SEC
BH CV ECHO MEAS - MV E/A: 1.4
BH CV ECHO MEAS - MV MAX PG: 2.6 MMHG
BH CV ECHO MEAS - MV MEAN PG: 1 MMHG
BH CV ECHO MEAS - MV P1/2T MAX VEL: 82.4 CM/SEC
BH CV ECHO MEAS - MV P1/2T: 74.7 MSEC
BH CV ECHO MEAS - MV V2 MAX: 80.8 CM/SEC
BH CV ECHO MEAS - MV V2 MEAN: 48.4 CM/SEC
BH CV ECHO MEAS - MV V2 VTI: 24.2 CM
BH CV ECHO MEAS - MVA P1/2T LCG: 2.7 CM^2
BH CV ECHO MEAS - MVA(P1/2T): 2.9 CM^2
BH CV ECHO MEAS - MVA(VTI): 5.3 CM^2
BH CV ECHO MEAS - PA MAX PG (FULL): 3.7 MMHG
BH CV ECHO MEAS - PA MAX PG: 5.7 MMHG
BH CV ECHO MEAS - PA MEAN PG (FULL): 1 MMHG
BH CV ECHO MEAS - PA MEAN PG: 2 MMHG
BH CV ECHO MEAS - PA V2 MAX: 119 CM/SEC
BH CV ECHO MEAS - PA V2 MEAN: 70.9 CM/SEC
BH CV ECHO MEAS - PA V2 VTI: 26.1 CM
BH CV ECHO MEAS - PI END-D VEL: 141 CM/SEC
BH CV ECHO MEAS - RAP SYSTOLE: 10 MMHG
BH CV ECHO MEAS - RV MAX PG: 2 MMHG
BH CV ECHO MEAS - RV MEAN PG: 1 MMHG
BH CV ECHO MEAS - RV V1 MAX: 70.5 CM/SEC
BH CV ECHO MEAS - RV V1 MEAN: 47.5 CM/SEC
BH CV ECHO MEAS - RV V1 VTI: 17.1 CM
BH CV ECHO MEAS - RVDD: 3.5 CM
BH CV ECHO MEAS - RVSP: 15.2 MMHG
BH CV ECHO MEAS - SI(AO): 97.4 ML/M^2
BH CV ECHO MEAS - SI(CUBED): 56.9 ML/M^2
BH CV ECHO MEAS - SI(LVOT): 62.4 ML/M^2
BH CV ECHO MEAS - SI(MOD-SP2): 27.1 ML/M^2
BH CV ECHO MEAS - SI(MOD-SP4): 28.8 ML/M^2
BH CV ECHO MEAS - SI(TEICH): 42.4 ML/M^2
BH CV ECHO MEAS - SV(AO): 200.7 ML
BH CV ECHO MEAS - SV(CUBED): 117.3 ML
BH CV ECHO MEAS - SV(LVOT): 128.5 ML
BH CV ECHO MEAS - SV(MOD-SP2): 55.8 ML
BH CV ECHO MEAS - SV(MOD-SP4): 59.4 ML
BH CV ECHO MEAS - SV(TEICH): 87.3 ML
BH CV ECHO MEAS - TR MAX VEL: 114 CM/SEC
BH CV LOWER ARTERIAL LEFT ABI RATIO: 0.59
BH CV LOWER ARTERIAL LEFT DORSALIS PEDIS SYS MAX: 103 MMHG
BH CV LOWER ARTERIAL LEFT POST TIBIAL SYS MAX: 98 MMHG
BH CV LOWER ARTERIAL RIGHT ABI RATIO: 1.07
BH CV LOWER ARTERIAL RIGHT DORSALIS PEDIS SYS MAX: 187 MMHG
BH CV LOWER ARTERIAL RIGHT POST TIBIAL SYS MAX: 170 MMHG
BH CV VAS BP RIGHT ARM: NORMAL MMHG
MAXIMAL PREDICTED HEART RATE: 157 BPM
STRESS TARGET HR: 133 BPM
UPPER ARTERIAL LEFT ARM BRACHIAL SYS MAX: 175 MMHG
UPPER ARTERIAL RIGHT ARM BRACHIAL SYS MAX: 155 MMHG

## 2021-12-30 ENCOUNTER — TELEPHONE (OUTPATIENT)
Dept: CARDIOLOGY | Facility: CLINIC | Age: 61
End: 2021-12-30

## 2021-12-30 RX ORDER — CILOSTAZOL 100 MG/1
100 TABLET ORAL 2 TIMES DAILY
Qty: 60 TABLET | Refills: 2 | Status: SHIPPED | OUTPATIENT
Start: 2021-12-30

## 2021-12-30 NOTE — TELEPHONE ENCOUNTER
Contacted patient to inform him of his echo results per Dr. Barnard:     Left ventricular ejection fraction appears to be 56 - 60%. Left ventricular systolic function is normal.  Patient voiced his understanding.

## 2021-12-30 NOTE — TELEPHONE ENCOUNTER
----- Message from Isis Joshua MA sent at 12/29/2021  4:11 PM CST -----    ----- Message -----  From: Matti Barnard MD  Sent: 12/28/2021   6:09 PM CST  To: Isis Joshua MA      ----- Message -----  From: Matti Barnard MD  Sent: 12/28/2021   6:08 PM CST  To: Matti Barnard MD

## 2021-12-30 NOTE — TELEPHONE ENCOUNTER
Contacted patient to inform him that per Dr. Barnard Looks like his disease in the SFA might have progressed. Patient stated that he is having no cramps or burning. Only time his left leg hurts is when he is walking and packing something. Informed patient that we would talk to Dr. Barnard and let him know. He voiced his understanding.

## 2021-12-30 NOTE — TELEPHONE ENCOUNTER
----- Message from Isis Joshua MA sent at 12/29/2021  4:10 PM CST -----    ----- Message -----  From: Matti Barnard MD  Sent: 12/28/2021   4:46 PM CST  To: Isis Joshua MA    Looks like his disease in the SFA might have progressed, if he is having symptoms, would recommend angigoram  ----- Message -----  From: Matti Barnard MD  Sent: 12/28/2021   4:45 PM CST  To: Matti Barnard MD

## 2021-12-30 NOTE — TELEPHONE ENCOUNTER
Called patient.. informed patient of the information below. Send in rx. transferred pt to make follow up appt.      ----- Message -----  From: Matti Barnard MD  Sent: 12/30/2021  12:26 PM CST  To: Kendall Epley, MA    Ok, can we please send him a presciprtion for Cilostazole 100mg BID and I will see him back in 2-3 weeks

## 2022-01-17 ENCOUNTER — PREP FOR SURGERY (OUTPATIENT)
Dept: OTHER | Facility: HOSPITAL | Age: 62
End: 2022-01-17

## 2022-01-17 ENCOUNTER — OFFICE VISIT (OUTPATIENT)
Dept: CARDIOLOGY | Facility: CLINIC | Age: 62
End: 2022-01-17

## 2022-01-17 VITALS
HEIGHT: 67 IN | TEMPERATURE: 98 F | HEART RATE: 72 BPM | SYSTOLIC BLOOD PRESSURE: 128 MMHG | WEIGHT: 210.8 LBS | DIASTOLIC BLOOD PRESSURE: 72 MMHG | BODY MASS INDEX: 33.09 KG/M2 | OXYGEN SATURATION: 98 %

## 2022-01-17 DIAGNOSIS — I73.9 PAD (PERIPHERAL ARTERY DISEASE): ICD-10-CM

## 2022-01-17 DIAGNOSIS — I73.9 PVD (PERIPHERAL VASCULAR DISEASE) WITH CLAUDICATION: Primary | ICD-10-CM

## 2022-01-17 DIAGNOSIS — E78.2 HYPERLIPEMIA, MIXED: ICD-10-CM

## 2022-01-17 DIAGNOSIS — R68.89 ABNORMAL ANKLE BRACHIAL INDEX (ABI): ICD-10-CM

## 2022-01-17 DIAGNOSIS — I10 HYPERTENSION, ESSENTIAL: Primary | ICD-10-CM

## 2022-01-17 PROCEDURE — 99214 OFFICE O/P EST MOD 30 MIN: CPT | Performed by: INTERNAL MEDICINE

## 2022-01-17 RX ORDER — ASPIRIN 81 MG/1
81 TABLET, CHEWABLE ORAL ONCE
Status: CANCELLED | OUTPATIENT
Start: 2022-01-17 | End: 2022-01-17

## 2022-01-17 RX ORDER — ASPIRIN 81 MG/1
81 TABLET ORAL DAILY
Status: CANCELLED | OUTPATIENT
Start: 2022-01-18

## 2022-01-17 RX ORDER — SODIUM CHLORIDE 0.9 % (FLUSH) 0.9 %
3 SYRINGE (ML) INJECTION EVERY 12 HOURS SCHEDULED
Status: CANCELLED | OUTPATIENT
Start: 2022-01-26

## 2022-01-17 RX ORDER — SODIUM CHLORIDE 0.9 % (FLUSH) 0.9 %
10 SYRINGE (ML) INJECTION AS NEEDED
Status: CANCELLED | OUTPATIENT
Start: 2022-01-26

## 2022-01-17 NOTE — PROGRESS NOTES
"  [facility]  OFFICE NOTE    Cardiovascular Medicine  Matti Barnard M.D., RPVI         No referring provider defined for this encounter.    Thank you for asking me to see Nikhil Hernadez for CAD.    Coronary Artery Disease      This is a 61 y.o. male with:    1. Coronary artery disease involving coronary bypass graft of native heart without angina pectoris    2. PVD (peripheral vascular disease) with claudication (CMS/[facility])    3. Essential hypertension    4. Pure hypercholesterolemia    5. Nicotine abuse          Chief complaint -CAD status post CABG, PVD     History of present Illness- 61 y.o.-year-old gentleman was history of coronary disease status post two-vessel bypass in 1995 at [facility] in the setting of an acute MI , He smokes about a pack-a-day for many years, and he drinks pretty heavy. He had total occlusion of the left SFA which was treated with \"atherectomy and drug-coated balloon by Dr. Acosta.  He is on Plavix and aspirin.     02/01/2021  No acute issues since last visit.  Continues to remain chest pain-free.  Denying any claudication.  Has been compliant with his aspirin Plavix without any bleeding problems.  No other acute complaints.  He has been pretty active at work and can climb 5 flights of stairs without any limitations from chest pain or claudications.      01/17/2022:  His main complaint at this time is claudications in the left calf, he had ABIs done since last visit showed severely reduced, has been started on cilostazol with some improvement however he still having significant symptoms and need to stop frequently      Review of Systems - ROS  Constitution: Negative for weakness, weight gain and weight loss.   HENT: Negative for congestion.    Eyes: Negative for blurred vision.   Cardiovascular: As mentioned above  Respiratory: Negative for cough and hemoptysis.    Endocrine: Negative for polydipsia and polyuria.   Hematologic/Lymphatic: Negative for " "bleeding problem. Does not bruise/bleed easily.   Skin: Negative for flushing.   Musculoskeletal: Negative for neck pain and stiffness.   Gastrointestinal: Negative for abdominal pain, diarrhea, jaundice, melena, nausea and vomiting.   Genitourinary: Negative for dysuria and hematuria.   Neurological: Negative for dizziness, focal weakness and numbness.   Psychiatric/Behavioral: Negative for altered mental status and depression.          All other systems were reviewed and were negative.    family history includes Cancer in his father and mother.     reports that he has been smoking cigarettes. He has been smoking about 1.00 pack per day. He has never used smokeless tobacco. He reports current alcohol use of about 15.0 standard drinks of alcohol per week. He reports that he does not use drugs.    Allergies   Allergen Reactions   • Aleve [Naproxen Sodium] Itching, Swelling and Rash   • Morphine And Related Nausea And Vomiting         Current Outpatient Medications:   •  amLODIPine (NORVASC) 5 MG tablet, Take 1 tablet by mouth Daily., Disp: 90 tablet, Rfl: 3  •  aspirin 81 MG chewable tablet, Chew 81 mg Daily., Disp: , Rfl:   •  atorvastatin (LIPITOR) 20 MG tablet, Take 20 mg by mouth Every Night., Disp: , Rfl:   •  cetirizine (zyrTEC) 10 MG tablet, Take 10 mg by mouth As Needed., Disp: , Rfl:   •  cilostazol (PLETAL) 100 MG tablet, Take 1 tablet by mouth 2 (Two) Times a Day., Disp: 60 tablet, Rfl: 2  •  clopidogrel (PLAVIX) 75 MG tablet, Take 1 tablet by mouth Daily., Disp: 90 tablet, Rfl: 3  •  lisinopril (PRINIVIL,ZESTRIL) 20 MG tablet, Take 20 mg by mouth Every Night., Disp: , Rfl:   •  TOPROL XL 50 MG 24 hr tablet, Take 50 mg by mouth Every Night., Disp: , Rfl:     Physical Exam:  Vitals:    01/17/22 0806   BP: 128/72   BP Location: Left arm   Patient Position: Sitting   Cuff Size: Adult   Pulse: 72   Temp: 98 °F (36.7 °C)   SpO2: 98%   Weight: 95.6 kg (210 lb 12.8 oz)   Height: 170.2 cm (67\")   PainSc: 0-No pain "     Current Pain Level: none  Pulse Ox: Normal  on room air  General: alert, appears stated age and cooperative     Body Habitus: well-nourished    HEENT: Head: Normocephalic, no lesions, without obvious abnormality. No arcus senilis, xanthelasma or xanthomas.    Neuro: alert, oriented x3  Pulses: 2+ and symmetric  JVP: Volume/Pulsation: Normal.  Normal waveforms.   Appropriate inspiratory decrease.  No Kussmaul's. No Laurel's.   Carotid Exam: no bruit normal pulsation bilaterally   Carotid Volume: normal.     Respirations: no increased work of breathing   Chest:  Normal    Pulmonary:Normal   Precordium: Normal impulses. P2 is not palpable.  RV Heave: absent  LV Heave: absent  [place]:  normal size and placement  Palpable S4: absent.  Heart rate: normal    Heart Rhythm: regular     Heart Sounds: S1: normal  S2: normal  S3: absent   S4: absent  Opening Snap: absent    Pericardial Rub:  Absent: .    Abdomen:   Appearance: normal .  Palpation: Soft, non-tender to palpation, bowel sounds positive in all four quadrants; no guarding or rebound tenderness  Extremity: no edema.   LE Skin: no rashes  LE Hair:  normal  LE Pulses: well perfused with normal pulses in the distal extremities  Pallor on elevation: Absent. Rubor on dependency: None      DATA REVIEWED:     EKG. I personally reviewed and interpreted the EKG.  Normal sinus rhythm.  Nonspecific ST-T changes.  Right bundle branch block.    ECG/EMG Results (all)     None        ---------------------------------------------------  TTE/ASHTYN:  Results for orders placed in visit on 12/13/21    Adult Transthoracic Echo Complete W/ Cont if Necessary Per Protocol    Interpretation Summary  · Left ventricular ejection fraction appears to be 56 - 60%. Left ventricular systolic function is normal.  · Left atrial volume is mildly increased.  · Left ventricular diastolic function was  normal.        --------------------------------------------------------------------------------------------------  LABS:     The CVD Risk score (Yonatan et al., 2008) failed to calculate for the following reasons:    The patient has a prior MI, stroke, CHF, or peripheral vascular disease diagnosis         Lab Results   Component Value Date    GLUCOSE 94 10/03/2018    BUN 8 10/03/2018    CREATININE 0.70 10/03/2018    EGFRIFNONA 115 10/03/2018    BCR 11.4 10/03/2018    K 3.9 10/03/2018    CO2 25.0 10/03/2018    CALCIUM 8.2 (L) 10/03/2018    ALBUMIN 3.90 09/11/2018    AST 52 09/11/2018    ALT 62 09/11/2018     Lab Results   Component Value Date    WBC 4.99 10/03/2018    HGB 13.3 (L) 10/03/2018    HCT 38.8 (L) 10/03/2018    MCV 91.3 10/03/2018     (L) 10/03/2018     Lab Results   Component Value Date    CHOL 102 10/03/2018    TRIG 59 10/03/2018    HDL 38 (L) 10/03/2018    LDL 42 10/03/2018     No results found for: TSH, X9CRPLF, Q3DVVGP, THYROIDAB  No results found for: CKTOTAL, CKMB, CKMBINDEX, TROPONINI, TROPONINT  No results found for: HGBA1C  No results found for: DDIMER  Lab Results   Component Value Date    ALT 62 09/11/2018     No results found for: HGBA1C  Lab Results   Component Value Date    CREATININE 0.70 10/03/2018     No results found for: IRON, TIBC, FERRITIN  Lab Results   Component Value Date    INR 1.04 10/02/2018    PROTIME 13.4 10/02/2018       Assessment/Plan     CAD status post CABG with 2 vessel bypass in 1995  with long-standing history of tobacco use and alcohol abuse.  Stress test showed  intermediate risk study with small to medium ischemia in the inferior wall in 2018.  However patient has been asymptomatic.  Has been treated with conservative therapy.  We will continue medical management for now.  If patient were to have symptoms of chest pain and will consider cardiac cath for further evaluation.  Continue aspirin Plavix.    Hyperlipidemia: LDL is less than 70 on Lipitor 20 mg.   Will need repeat blood work.    Peripheral vascular disease -status post PTA to the left SFA and will continue aspirin and Plavix.    ABIs have severely decreased on the left side.  He still having symptoms despite being on cilostazol.  Risks and benefits of invasive evaluation were discussed and patient wants to proceed with peripheral angiogram with potential angioplasty.    Hypertension/hyperlipidemia on atorvastatin and lisinopril with Toprol-XL and amlodipine.        Prevention:  Patient's Body mass index is 33.02 kg/m². BMI is above normal parameters. Recommendations include: exercise counseling and nutrition counseling.      Patient reported he has stopped smoking.         AAA Screening:   Check after he turned 63.            This document has been electronically signed by Matti Barnard MD on January 17, 2022 08:14 CST

## 2022-01-17 NOTE — H&P (VIEW-ONLY)
"  [facility]  OFFICE NOTE    Cardiovascular Medicine  Matti Barnard M.D., RPVI         No referring provider defined for this encounter.    Thank you for asking me to see Nikhil Hernadez for CAD.    Coronary Artery Disease      This is a 61 y.o. male with:    1. Coronary artery disease involving coronary bypass graft of native heart without angina pectoris    2. PVD (peripheral vascular disease) with claudication (CMS/[facility])    3. Essential hypertension    4. Pure hypercholesterolemia    5. Nicotine abuse          Chief complaint -CAD status post CABG, PVD     History of present Illness- 61 y.o.-year-old gentleman was history of coronary disease status post two-vessel bypass in 1995 at [facility] in the setting of an acute MI , He smokes about a pack-a-day for many years, and he drinks pretty heavy. He had total occlusion of the left SFA which was treated with \"atherectomy and drug-coated balloon by Dr. Acosta.  He is on Plavix and aspirin.     02/01/2021  No acute issues since last visit.  Continues to remain chest pain-free.  Denying any claudication.  Has been compliant with his aspirin Plavix without any bleeding problems.  No other acute complaints.  He has been pretty active at work and can climb 5 flights of stairs without any limitations from chest pain or claudications.      01/17/2022:  His main complaint at this time is claudications in the left calf, he had ABIs done since last visit showed severely reduced, has been started on cilostazol with some improvement however he still having significant symptoms and need to stop frequently      Review of Systems - ROS  Constitution: Negative for weakness, weight gain and weight loss.   HENT: Negative for congestion.    Eyes: Negative for blurred vision.   Cardiovascular: As mentioned above  Respiratory: Negative for cough and hemoptysis.    Endocrine: Negative for polydipsia and polyuria.   Hematologic/Lymphatic: Negative for " "bleeding problem. Does not bruise/bleed easily.   Skin: Negative for flushing.   Musculoskeletal: Negative for neck pain and stiffness.   Gastrointestinal: Negative for abdominal pain, diarrhea, jaundice, melena, nausea and vomiting.   Genitourinary: Negative for dysuria and hematuria.   Neurological: Negative for dizziness, focal weakness and numbness.   Psychiatric/Behavioral: Negative for altered mental status and depression.          All other systems were reviewed and were negative.    family history includes Cancer in his father and mother.     reports that he has been smoking cigarettes. He has been smoking about 1.00 pack per day. He has never used smokeless tobacco. He reports current alcohol use of about 15.0 standard drinks of alcohol per week. He reports that he does not use drugs.    Allergies   Allergen Reactions   • Aleve [Naproxen Sodium] Itching, Swelling and Rash   • Morphine And Related Nausea And Vomiting         Current Outpatient Medications:   •  amLODIPine (NORVASC) 5 MG tablet, Take 1 tablet by mouth Daily., Disp: 90 tablet, Rfl: 3  •  aspirin 81 MG chewable tablet, Chew 81 mg Daily., Disp: , Rfl:   •  atorvastatin (LIPITOR) 20 MG tablet, Take 20 mg by mouth Every Night., Disp: , Rfl:   •  cetirizine (zyrTEC) 10 MG tablet, Take 10 mg by mouth As Needed., Disp: , Rfl:   •  cilostazol (PLETAL) 100 MG tablet, Take 1 tablet by mouth 2 (Two) Times a Day., Disp: 60 tablet, Rfl: 2  •  clopidogrel (PLAVIX) 75 MG tablet, Take 1 tablet by mouth Daily., Disp: 90 tablet, Rfl: 3  •  lisinopril (PRINIVIL,ZESTRIL) 20 MG tablet, Take 20 mg by mouth Every Night., Disp: , Rfl:   •  TOPROL XL 50 MG 24 hr tablet, Take 50 mg by mouth Every Night., Disp: , Rfl:     Physical Exam:  Vitals:    01/17/22 0806   BP: 128/72   BP Location: Left arm   Patient Position: Sitting   Cuff Size: Adult   Pulse: 72   Temp: 98 °F (36.7 °C)   SpO2: 98%   Weight: 95.6 kg (210 lb 12.8 oz)   Height: 170.2 cm (67\")   PainSc: 0-No pain "     Current Pain Level: none  Pulse Ox: Normal  on room air  General: alert, appears stated age and cooperative     Body Habitus: well-nourished    HEENT: Head: Normocephalic, no lesions, without obvious abnormality. No arcus senilis, xanthelasma or xanthomas.    Neuro: alert, oriented x3  Pulses: 2+ and symmetric  JVP: Volume/Pulsation: Normal.  Normal waveforms.   Appropriate inspiratory decrease.  No Kussmaul's. No Laurel's.   Carotid Exam: no bruit normal pulsation bilaterally   Carotid Volume: normal.     Respirations: no increased work of breathing   Chest:  Normal    Pulmonary:Normal   Precordium: Normal impulses. P2 is not palpable.  RV Heave: absent  LV Heave: absent  [place]:  normal size and placement  Palpable S4: absent.  Heart rate: normal    Heart Rhythm: regular     Heart Sounds: S1: normal  S2: normal  S3: absent   S4: absent  Opening Snap: absent    Pericardial Rub:  Absent: .    Abdomen:   Appearance: normal .  Palpation: Soft, non-tender to palpation, bowel sounds positive in all four quadrants; no guarding or rebound tenderness  Extremity: no edema.   LE Skin: no rashes  LE Hair:  normal  LE Pulses: well perfused with normal pulses in the distal extremities  Pallor on elevation: Absent. Rubor on dependency: None      DATA REVIEWED:     EKG. I personally reviewed and interpreted the EKG.  Normal sinus rhythm.  Nonspecific ST-T changes.  Right bundle branch block.    ECG/EMG Results (all)     None        ---------------------------------------------------  TTE/ASHTYN:  Results for orders placed in visit on 12/13/21    Adult Transthoracic Echo Complete W/ Cont if Necessary Per Protocol    Interpretation Summary  · Left ventricular ejection fraction appears to be 56 - 60%. Left ventricular systolic function is normal.  · Left atrial volume is mildly increased.  · Left ventricular diastolic function was  normal.        --------------------------------------------------------------------------------------------------  LABS:     The CVD Risk score (Yonatan et al., 2008) failed to calculate for the following reasons:    The patient has a prior MI, stroke, CHF, or peripheral vascular disease diagnosis         Lab Results   Component Value Date    GLUCOSE 94 10/03/2018    BUN 8 10/03/2018    CREATININE 0.70 10/03/2018    EGFRIFNONA 115 10/03/2018    BCR 11.4 10/03/2018    K 3.9 10/03/2018    CO2 25.0 10/03/2018    CALCIUM 8.2 (L) 10/03/2018    ALBUMIN 3.90 09/11/2018    AST 52 09/11/2018    ALT 62 09/11/2018     Lab Results   Component Value Date    WBC 4.99 10/03/2018    HGB 13.3 (L) 10/03/2018    HCT 38.8 (L) 10/03/2018    MCV 91.3 10/03/2018     (L) 10/03/2018     Lab Results   Component Value Date    CHOL 102 10/03/2018    TRIG 59 10/03/2018    HDL 38 (L) 10/03/2018    LDL 42 10/03/2018     No results found for: TSH, N6PANXE, J5JONES, THYROIDAB  No results found for: CKTOTAL, CKMB, CKMBINDEX, TROPONINI, TROPONINT  No results found for: HGBA1C  No results found for: DDIMER  Lab Results   Component Value Date    ALT 62 09/11/2018     No results found for: HGBA1C  Lab Results   Component Value Date    CREATININE 0.70 10/03/2018     No results found for: IRON, TIBC, FERRITIN  Lab Results   Component Value Date    INR 1.04 10/02/2018    PROTIME 13.4 10/02/2018       Assessment/Plan     CAD status post CABG with 2 vessel bypass in 1995  with long-standing history of tobacco use and alcohol abuse.  Stress test showed  intermediate risk study with small to medium ischemia in the inferior wall in 2018.  However patient has been asymptomatic.  Has been treated with conservative therapy.  We will continue medical management for now.  If patient were to have symptoms of chest pain and will consider cardiac cath for further evaluation.  Continue aspirin Plavix.    Hyperlipidemia: LDL is less than 70 on Lipitor 20 mg.   Will need repeat blood work.    Peripheral vascular disease -status post PTA to the left SFA and will continue aspirin and Plavix.    ABIs have severely decreased on the left side.  He still having symptoms despite being on cilostazol.  Risks and benefits of invasive evaluation were discussed and patient wants to proceed with peripheral angiogram with potential angioplasty.    Hypertension/hyperlipidemia on atorvastatin and lisinopril with Toprol-XL and amlodipine.        Prevention:  Patient's Body mass index is 33.02 kg/m². BMI is above normal parameters. Recommendations include: exercise counseling and nutrition counseling.      Patient reported he has stopped smoking.         AAA Screening:   Check after he turned 63.            This document has been electronically signed by Matti Barnard MD on January 17, 2022 08:14 CST

## 2022-01-18 ENCOUNTER — TELEPHONE (OUTPATIENT)
Dept: CARDIOLOGY | Facility: CLINIC | Age: 62
End: 2022-01-18

## 2022-01-18 PROBLEM — R68.89 ABNORMAL ANKLE BRACHIAL INDEX (ABI): Status: ACTIVE | Noted: 2022-01-18

## 2022-01-18 NOTE — TELEPHONE ENCOUNTER
Patient instructed to hold pletal the day before and morning of procedure. He was asked to continue the plavix and aspirin for procedure.

## 2022-01-18 NOTE — TELEPHONE ENCOUNTER
----- Message from ORTEGA Dominguez sent at 1/17/2022 11:16 AM CST -----  Orders in     ----- Message -----  From: Isis Joshua MA  Sent: 1/17/2022   8:40 AM CST  To: ORTEGA Dominguez, #      ----- Message -----  From: Matti Barnard MD  Sent: 1/17/2022   8:32 AM CST  To: Isis Joshua MA    Can you please schedule him for peripheral angiogram/angioplasty on the 26th?    Patient scheduled for peripheral angiogram on 1/26/22. The patient will have covid testing on 1/24/22. All instructions discussed with the patient. He may have a light breakfast before 6 am.

## 2022-01-24 ENCOUNTER — LAB (OUTPATIENT)
Dept: LAB | Facility: HOSPITAL | Age: 62
End: 2022-01-24

## 2022-01-24 DIAGNOSIS — I73.9 PVD (PERIPHERAL VASCULAR DISEASE) WITH CLAUDICATION: ICD-10-CM

## 2022-01-24 DIAGNOSIS — R68.89 ABNORMAL ANKLE BRACHIAL INDEX (ABI): ICD-10-CM

## 2022-01-24 LAB — SARS-COV-2 N GENE RESP QL NAA+PROBE: NOT DETECTED

## 2022-01-24 PROCEDURE — C9803 HOPD COVID-19 SPEC COLLECT: HCPCS

## 2022-01-24 PROCEDURE — 87635 SARS-COV-2 COVID-19 AMP PRB: CPT

## 2022-01-26 ENCOUNTER — HOSPITAL ENCOUNTER (OUTPATIENT)
Facility: HOSPITAL | Age: 62
Setting detail: HOSPITAL OUTPATIENT SURGERY
Discharge: HOME | End: 2022-01-26
Attending: INTERNAL MEDICINE | Admitting: INTERNAL MEDICINE

## 2022-01-26 VITALS
DIASTOLIC BLOOD PRESSURE: 84 MMHG | BODY MASS INDEX: 32.46 KG/M2 | HEIGHT: 67 IN | WEIGHT: 206.79 LBS | HEART RATE: 69 BPM | RESPIRATION RATE: 20 BRPM | TEMPERATURE: 97.7 F | SYSTOLIC BLOOD PRESSURE: 154 MMHG | OXYGEN SATURATION: 98 %

## 2022-01-26 DIAGNOSIS — R68.89 ABNORMAL ANKLE BRACHIAL INDEX (ABI): ICD-10-CM

## 2022-01-26 DIAGNOSIS — I73.9 PVD (PERIPHERAL VASCULAR DISEASE) WITH CLAUDICATION: ICD-10-CM

## 2022-01-26 LAB
ANION GAP SERPL CALCULATED.3IONS-SCNC: 13 MMOL/L (ref 5–15)
BUN SERPL-MCNC: 7 MG/DL (ref 8–23)
BUN/CREAT SERPL: 11.7 (ref 7–25)
CALCIUM SPEC-SCNC: 9.3 MG/DL (ref 8.6–10.5)
CHLORIDE SERPL-SCNC: 101 MMOL/L (ref 98–107)
CO2 SERPL-SCNC: 25 MMOL/L (ref 22–29)
CREAT SERPL-MCNC: 0.6 MG/DL (ref 0.76–1.27)
DEPRECATED RDW RBC AUTO: 44.3 FL (ref 37–54)
ERYTHROCYTE [DISTWIDTH] IN BLOOD BY AUTOMATED COUNT: 13.8 % (ref 12.3–15.4)
GFR SERPL CREATININE-BSD FRML MDRD: 136 ML/MIN/1.73
GLUCOSE SERPL-MCNC: 92 MG/DL (ref 65–99)
HCT VFR BLD AUTO: 43.9 % (ref 37.5–51)
HGB BLD-MCNC: 15.1 G/DL (ref 13–17.7)
INR PPP: 1.06 (ref 0.8–1.2)
MCH RBC QN AUTO: 30.3 PG (ref 26.6–33)
MCHC RBC AUTO-ENTMCNC: 34.4 G/DL (ref 31.5–35.7)
MCV RBC AUTO: 88.2 FL (ref 79–97)
PLATELET # BLD AUTO: 223 10*3/MM3 (ref 140–450)
PMV BLD AUTO: 9.8 FL (ref 6–12)
POTASSIUM SERPL-SCNC: 3.7 MMOL/L (ref 3.5–5.2)
PROTHROMBIN TIME: 13.7 SECONDS (ref 11.1–15.3)
RBC # BLD AUTO: 4.98 10*6/MM3 (ref 4.14–5.8)
SODIUM SERPL-SCNC: 139 MMOL/L (ref 136–145)
WBC NRBC COR # BLD: 7.56 10*3/MM3 (ref 3.4–10.8)

## 2022-01-26 PROCEDURE — 0 IOPAMIDOL PER 1 ML: Performed by: INTERNAL MEDICINE

## 2022-01-26 PROCEDURE — 99152 MOD SED SAME PHYS/QHP 5/>YRS: CPT | Performed by: INTERNAL MEDICINE

## 2022-01-26 PROCEDURE — 25010000002 MIDAZOLAM PER 1 MG: Performed by: INTERNAL MEDICINE

## 2022-01-26 PROCEDURE — 25010000002 FENTANYL CITRATE (PF) 50 MCG/ML SOLUTION: Performed by: INTERNAL MEDICINE

## 2022-01-26 PROCEDURE — 99153 MOD SED SAME PHYS/QHP EA: CPT | Performed by: INTERNAL MEDICINE

## 2022-01-26 PROCEDURE — 85027 COMPLETE CBC AUTOMATED: CPT | Performed by: INTERNAL MEDICINE

## 2022-01-26 PROCEDURE — 25010000002 HEPARIN (PORCINE) PER 1000 UNITS: Performed by: INTERNAL MEDICINE

## 2022-01-26 PROCEDURE — C1894 INTRO/SHEATH, NON-LASER: HCPCS | Performed by: INTERNAL MEDICINE

## 2022-01-26 PROCEDURE — C1887 CATHETER, GUIDING: HCPCS | Performed by: INTERNAL MEDICINE

## 2022-01-26 PROCEDURE — 80048 BASIC METABOLIC PNL TOTAL CA: CPT | Performed by: INTERNAL MEDICINE

## 2022-01-26 PROCEDURE — 85610 PROTHROMBIN TIME: CPT | Performed by: INTERNAL MEDICINE

## 2022-01-26 PROCEDURE — 75716 ARTERY X-RAYS ARMS/LEGS: CPT | Performed by: INTERNAL MEDICINE

## 2022-01-26 PROCEDURE — C1760 CLOSURE DEV, VASC: HCPCS | Performed by: INTERNAL MEDICINE

## 2022-01-26 PROCEDURE — C1769 GUIDE WIRE: HCPCS | Performed by: INTERNAL MEDICINE

## 2022-01-26 PROCEDURE — C1894 INTRO/SHEATH, NON-LASER: HCPCS

## 2022-01-26 PROCEDURE — 36200 PLACE CATHETER IN AORTA: CPT | Performed by: INTERNAL MEDICINE

## 2022-01-26 RX ORDER — MIDAZOLAM HYDROCHLORIDE 1 MG/ML
INJECTION INTRAMUSCULAR; INTRAVENOUS AS NEEDED
Status: DISCONTINUED | OUTPATIENT
Start: 2022-01-26 | End: 2022-01-26 | Stop reason: HOSPADM

## 2022-01-26 RX ORDER — ASPIRIN 81 MG/1
81 TABLET, CHEWABLE ORAL ONCE
Status: DISCONTINUED | OUTPATIENT
Start: 2022-01-26 | End: 2022-01-26 | Stop reason: HOSPADM

## 2022-01-26 RX ORDER — ASPIRIN 81 MG/1
81 TABLET ORAL DAILY
Status: DISCONTINUED | OUTPATIENT
Start: 2022-01-27 | End: 2022-01-26 | Stop reason: HOSPADM

## 2022-01-26 RX ORDER — FENTANYL CITRATE 50 UG/ML
INJECTION, SOLUTION INTRAMUSCULAR; INTRAVENOUS AS NEEDED
Status: DISCONTINUED | OUTPATIENT
Start: 2022-01-26 | End: 2022-01-26 | Stop reason: HOSPADM

## 2022-01-26 RX ORDER — SODIUM CHLORIDE 0.9 % (FLUSH) 0.9 %
3 SYRINGE (ML) INJECTION EVERY 12 HOURS SCHEDULED
Status: DISCONTINUED | OUTPATIENT
Start: 2022-01-26 | End: 2022-01-26 | Stop reason: HOSPADM

## 2022-01-26 RX ORDER — LIDOCAINE HYDROCHLORIDE 20 MG/ML
INJECTION, SOLUTION INFILTRATION; PERINEURAL AS NEEDED
Status: DISCONTINUED | OUTPATIENT
Start: 2022-01-26 | End: 2022-01-26 | Stop reason: HOSPADM

## 2022-01-26 RX ORDER — SODIUM CHLORIDE 0.9 % (FLUSH) 0.9 %
10 SYRINGE (ML) INJECTION AS NEEDED
Status: DISCONTINUED | OUTPATIENT
Start: 2022-01-26 | End: 2022-01-26 | Stop reason: HOSPADM

## 2022-01-26 RX ORDER — ACETAMINOPHEN 325 MG/1
650 TABLET ORAL EVERY 4 HOURS PRN
Status: DISCONTINUED | OUTPATIENT
Start: 2022-01-26 | End: 2022-01-26 | Stop reason: HOSPADM

## 2022-01-26 RX ORDER — SODIUM CHLORIDE 9 MG/ML
50 INJECTION, SOLUTION INTRAVENOUS CONTINUOUS
Status: DISCONTINUED | OUTPATIENT
Start: 2022-01-26 | End: 2022-01-26 | Stop reason: HOSPADM

## 2022-01-26 RX ORDER — SODIUM CHLORIDE 9 MG/ML
125 INJECTION, SOLUTION INTRAVENOUS CONTINUOUS
Status: ACTIVE | OUTPATIENT
Start: 2022-01-26 | End: 2022-01-26

## 2022-01-26 RX ADMIN — SODIUM CHLORIDE 125 ML/HR: 9 INJECTION, SOLUTION INTRAVENOUS at 15:15

## 2022-01-26 RX ADMIN — SODIUM CHLORIDE 50 ML/HR: 9 INJECTION, SOLUTION INTRAVENOUS at 11:39

## 2022-01-26 NOTE — INTERVAL H&P NOTE
Peripheral angiogram preop:    Invasive peripheral angiography was recommended to the patient.  The patientdenies  bleeding issues. The patient reports use of antiplatelet agents.  The patient denies  CKD. The patient denies  contrast allergy. The patient denies  use of diabetes medications.    Pre-Cath Surgical History: NA    The indications, risks/benefits and alternatives of diagnostic peripheral catheterization and angioplasty, conscious sedation, and possible blood transfusion were discussed in detail with the patient. The potential complications of 1/2000 chance of death, 1/1000 chance of heart attack or stroke, 1/500 chance of bleeding or clotting of the femoral artery, and 1/500 chance of allergic reaction to contrast were discussed.  Risks of vascular perforation, dissection distal embolization also discussed with the patient.  No contraindications were found. The patient  appeared to understand and agreed to the above.  Peripheral angiography  Peripheral angioplasty    ASA Class: III  Mallampati Score: II      Contraindications to DAPT: None    H&P reviewed. The patient was examined and there are no changes to the H&P.

## 2022-01-27 ENCOUNTER — DOCUMENTATION (OUTPATIENT)
Dept: CARDIAC REHAB | Facility: HOSPITAL | Age: 62
End: 2022-01-27

## 2022-01-28 ENCOUNTER — TELEPHONE (OUTPATIENT)
Dept: CARDIOLOGY | Facility: CLINIC | Age: 62
End: 2022-01-28

## 2022-02-01 ENCOUNTER — LAB (OUTPATIENT)
Dept: LAB | Facility: HOSPITAL | Age: 62
End: 2022-02-01

## 2022-02-01 ENCOUNTER — TELEPHONE (OUTPATIENT)
Dept: CARDIOLOGY | Facility: CLINIC | Age: 62
End: 2022-02-01

## 2022-02-01 ENCOUNTER — PREP FOR SURGERY (OUTPATIENT)
Dept: OTHER | Facility: HOSPITAL | Age: 62
End: 2022-02-01

## 2022-02-01 DIAGNOSIS — I73.9 PVD (PERIPHERAL VASCULAR DISEASE) WITH CLAUDICATION: ICD-10-CM

## 2022-02-01 DIAGNOSIS — I73.9 PVD (PERIPHERAL VASCULAR DISEASE) WITH CLAUDICATION: Primary | ICD-10-CM

## 2022-02-01 LAB — SARS-COV-2 N GENE RESP QL NAA+PROBE: NOT DETECTED

## 2022-02-01 PROCEDURE — 87635 SARS-COV-2 COVID-19 AMP PRB: CPT

## 2022-02-01 PROCEDURE — C9803 HOPD COVID-19 SPEC COLLECT: HCPCS

## 2022-02-01 RX ORDER — SODIUM CHLORIDE 0.9 % (FLUSH) 0.9 %
3 SYRINGE (ML) INJECTION EVERY 12 HOURS SCHEDULED
Status: CANCELLED | OUTPATIENT
Start: 2022-02-02

## 2022-02-01 RX ORDER — ASPIRIN 81 MG/1
81 TABLET, CHEWABLE ORAL ONCE
Status: CANCELLED | OUTPATIENT
Start: 2022-02-01 | End: 2022-02-01

## 2022-02-01 RX ORDER — SODIUM CHLORIDE 0.9 % (FLUSH) 0.9 %
10 SYRINGE (ML) INJECTION AS NEEDED
Status: CANCELLED | OUTPATIENT
Start: 2022-02-02

## 2022-02-01 RX ORDER — ASPIRIN 81 MG/1
81 TABLET ORAL DAILY
Status: CANCELLED | OUTPATIENT
Start: 2022-02-02

## 2022-02-01 NOTE — TELEPHONE ENCOUNTER
Patient advised to get covid testing today between the hours of 9-9:45 am. He will stop be the office for instructions after covid testing. His procedure will be scheduled for tomorrow.

## 2022-02-02 ENCOUNTER — HOSPITAL ENCOUNTER (OUTPATIENT)
Facility: HOSPITAL | Age: 62
Discharge: HOME | End: 2022-02-03
Attending: INTERNAL MEDICINE | Admitting: INTERNAL MEDICINE

## 2022-02-02 DIAGNOSIS — I73.9 PVD (PERIPHERAL VASCULAR DISEASE) WITH CLAUDICATION: ICD-10-CM

## 2022-02-02 LAB
ANION GAP SERPL CALCULATED.3IONS-SCNC: 11 MMOL/L (ref 5–15)
BUN SERPL-MCNC: 6 MG/DL (ref 8–23)
BUN/CREAT SERPL: 10.7 (ref 7–25)
CALCIUM SPEC-SCNC: 9.3 MG/DL (ref 8.6–10.5)
CHLORIDE SERPL-SCNC: 102 MMOL/L (ref 98–107)
CO2 SERPL-SCNC: 24 MMOL/L (ref 22–29)
CREAT SERPL-MCNC: 0.56 MG/DL (ref 0.76–1.27)
DEPRECATED RDW RBC AUTO: 44.3 FL (ref 37–54)
ERYTHROCYTE [DISTWIDTH] IN BLOOD BY AUTOMATED COUNT: 13.6 % (ref 12.3–15.4)
GFR SERPL CREATININE-BSD FRML MDRD: 147 ML/MIN/1.73
GLUCOSE SERPL-MCNC: 90 MG/DL (ref 65–99)
HCT VFR BLD AUTO: 43.4 % (ref 37.5–51)
HGB BLD-MCNC: 15.2 G/DL (ref 13–17.7)
INR PPP: 1.02 (ref 0.8–1.2)
MCH RBC QN AUTO: 31 PG (ref 26.6–33)
MCHC RBC AUTO-ENTMCNC: 35 G/DL (ref 31.5–35.7)
MCV RBC AUTO: 88.4 FL (ref 79–97)
PLATELET # BLD AUTO: 245 10*3/MM3 (ref 140–450)
PMV BLD AUTO: 9.9 FL (ref 6–12)
POTASSIUM SERPL-SCNC: 3.7 MMOL/L (ref 3.5–5.2)
PROTHROMBIN TIME: 13.3 SECONDS (ref 11.1–15.3)
RBC # BLD AUTO: 4.91 10*6/MM3 (ref 4.14–5.8)
SODIUM SERPL-SCNC: 137 MMOL/L (ref 136–145)
WBC NRBC COR # BLD: 7.81 10*3/MM3 (ref 3.4–10.8)

## 2022-02-02 PROCEDURE — C1876 STENT, NON-COA/NON-COV W/DEL: HCPCS

## 2022-02-02 PROCEDURE — G0378 HOSPITAL OBSERVATION PER HR: HCPCS

## 2022-02-02 PROCEDURE — C1760 CLOSURE DEV, VASC: HCPCS | Performed by: INTERNAL MEDICINE

## 2022-02-02 PROCEDURE — 75716 ARTERY X-RAYS ARMS/LEGS: CPT | Performed by: INTERNAL MEDICINE

## 2022-02-02 PROCEDURE — 25010000002 HEPARIN (PORCINE) PER 1000 UNITS: Performed by: INTERNAL MEDICINE

## 2022-02-02 PROCEDURE — C1769 GUIDE WIRE: HCPCS | Performed by: INTERNAL MEDICINE

## 2022-02-02 PROCEDURE — S0260 H&P FOR SURGERY: HCPCS | Performed by: INTERNAL MEDICINE

## 2022-02-02 PROCEDURE — 25010000002 FENTANYL CITRATE (PF) 50 MCG/ML SOLUTION: Performed by: INTERNAL MEDICINE

## 2022-02-02 PROCEDURE — 0 IOPAMIDOL PER 1 ML: Performed by: INTERNAL MEDICINE

## 2022-02-02 PROCEDURE — C1894 INTRO/SHEATH, NON-LASER: HCPCS | Performed by: INTERNAL MEDICINE

## 2022-02-02 PROCEDURE — C1725 CATH, TRANSLUMIN NON-LASER: HCPCS | Performed by: INTERNAL MEDICINE

## 2022-02-02 PROCEDURE — 85027 COMPLETE CBC AUTOMATED: CPT | Performed by: INTERNAL MEDICINE

## 2022-02-02 PROCEDURE — 37221 PR REVSC OPN/PRQ ILIAC ART W/STNT PLMT & ANGIOPLSTY: CPT | Performed by: INTERNAL MEDICINE

## 2022-02-02 PROCEDURE — 80048 BASIC METABOLIC PNL TOTAL CA: CPT | Performed by: INTERNAL MEDICINE

## 2022-02-02 PROCEDURE — C1760 CLOSURE DEV, VASC: HCPCS

## 2022-02-02 PROCEDURE — 85610 PROTHROMBIN TIME: CPT | Performed by: INTERNAL MEDICINE

## 2022-02-02 PROCEDURE — C1887 CATHETER, GUIDING: HCPCS | Performed by: INTERNAL MEDICINE

## 2022-02-02 PROCEDURE — 25010000002 MIDAZOLAM PER 1 MG: Performed by: INTERNAL MEDICINE

## 2022-02-02 DEVICE — STNT BIL VISIPRO .035 8F27MM 80CM: Type: IMPLANTABLE DEVICE | Site: LEG | Status: FUNCTIONAL

## 2022-02-02 RX ORDER — CETIRIZINE HYDROCHLORIDE 10 MG/1
10 TABLET ORAL DAILY PRN
Status: DISCONTINUED | OUTPATIENT
Start: 2022-02-02 | End: 2022-02-03 | Stop reason: HOSPADM

## 2022-02-02 RX ORDER — MIDAZOLAM HYDROCHLORIDE 1 MG/ML
INJECTION INTRAMUSCULAR; INTRAVENOUS AS NEEDED
Status: DISCONTINUED | OUTPATIENT
Start: 2022-02-02 | End: 2022-02-02 | Stop reason: HOSPADM

## 2022-02-02 RX ORDER — ASPIRIN 81 MG/1
81 TABLET ORAL DAILY
Status: DISCONTINUED | OUTPATIENT
Start: 2022-02-03 | End: 2022-02-02 | Stop reason: HOSPADM

## 2022-02-02 RX ORDER — SODIUM CHLORIDE 9 MG/ML
125 INJECTION, SOLUTION INTRAVENOUS CONTINUOUS
Status: ACTIVE | OUTPATIENT
Start: 2022-02-02 | End: 2022-02-02

## 2022-02-02 RX ORDER — CLOPIDOGREL BISULFATE 75 MG/1
75 TABLET ORAL DAILY
Status: DISCONTINUED | OUTPATIENT
Start: 2022-02-03 | End: 2022-02-03 | Stop reason: HOSPADM

## 2022-02-02 RX ORDER — HEPARIN SODIUM 1000 [USP'U]/ML
INJECTION, SOLUTION INTRAVENOUS; SUBCUTANEOUS AS NEEDED
Status: DISCONTINUED | OUTPATIENT
Start: 2022-02-02 | End: 2022-02-02 | Stop reason: HOSPADM

## 2022-02-02 RX ORDER — CILOSTAZOL 100 MG/1
100 TABLET ORAL 2 TIMES DAILY
Status: DISCONTINUED | OUTPATIENT
Start: 2022-02-03 | End: 2022-02-03 | Stop reason: HOSPADM

## 2022-02-02 RX ORDER — LIDOCAINE HYDROCHLORIDE 20 MG/ML
INJECTION, SOLUTION INFILTRATION; PERINEURAL AS NEEDED
Status: DISCONTINUED | OUTPATIENT
Start: 2022-02-02 | End: 2022-02-02 | Stop reason: HOSPADM

## 2022-02-02 RX ORDER — FENTANYL CITRATE 50 UG/ML
INJECTION, SOLUTION INTRAMUSCULAR; INTRAVENOUS AS NEEDED
Status: DISCONTINUED | OUTPATIENT
Start: 2022-02-02 | End: 2022-02-02 | Stop reason: HOSPADM

## 2022-02-02 RX ORDER — AMLODIPINE BESYLATE 5 MG/1
5 TABLET ORAL
Status: DISCONTINUED | OUTPATIENT
Start: 2022-02-03 | End: 2022-02-03 | Stop reason: HOSPADM

## 2022-02-02 RX ORDER — CLOPIDOGREL BISULFATE 75 MG/1
TABLET ORAL AS NEEDED
Status: DISCONTINUED | OUTPATIENT
Start: 2022-02-02 | End: 2022-02-02 | Stop reason: HOSPADM

## 2022-02-02 RX ORDER — SODIUM CHLORIDE 0.9 % (FLUSH) 0.9 %
3 SYRINGE (ML) INJECTION EVERY 12 HOURS SCHEDULED
Status: DISCONTINUED | OUTPATIENT
Start: 2022-02-02 | End: 2022-02-02 | Stop reason: HOSPADM

## 2022-02-02 RX ORDER — METOPROLOL SUCCINATE 50 MG/1
50 TABLET, EXTENDED RELEASE ORAL NIGHTLY
Status: DISCONTINUED | OUTPATIENT
Start: 2022-02-03 | End: 2022-02-03 | Stop reason: HOSPADM

## 2022-02-02 RX ORDER — ATORVASTATIN CALCIUM 20 MG/1
20 TABLET, FILM COATED ORAL NIGHTLY
Status: DISCONTINUED | OUTPATIENT
Start: 2022-02-02 | End: 2022-02-03 | Stop reason: HOSPADM

## 2022-02-02 RX ORDER — LISINOPRIL 20 MG/1
20 TABLET ORAL NIGHTLY
Status: DISCONTINUED | OUTPATIENT
Start: 2022-02-03 | End: 2022-02-03 | Stop reason: HOSPADM

## 2022-02-02 RX ORDER — ASPIRIN 81 MG/1
81 TABLET, CHEWABLE ORAL ONCE
Status: DISCONTINUED | OUTPATIENT
Start: 2022-02-02 | End: 2022-02-02 | Stop reason: HOSPADM

## 2022-02-02 RX ORDER — SODIUM CHLORIDE 0.9 % (FLUSH) 0.9 %
10 SYRINGE (ML) INJECTION AS NEEDED
Status: DISCONTINUED | OUTPATIENT
Start: 2022-02-02 | End: 2022-02-02 | Stop reason: HOSPADM

## 2022-02-02 RX ORDER — ACETAMINOPHEN 325 MG/1
650 TABLET ORAL EVERY 4 HOURS PRN
Status: DISCONTINUED | OUTPATIENT
Start: 2022-02-02 | End: 2022-02-03 | Stop reason: HOSPADM

## 2022-02-02 RX ORDER — ASPIRIN 81 MG/1
81 TABLET, CHEWABLE ORAL DAILY
Status: DISCONTINUED | OUTPATIENT
Start: 2022-02-03 | End: 2022-02-03 | Stop reason: HOSPADM

## 2022-02-02 RX ADMIN — ATORVASTATIN CALCIUM 20 MG: 20 TABLET, FILM COATED ORAL at 20:15

## 2022-02-02 NOTE — BRIEF OP NOTE
Pre-Op Diagnosis:  PAD  Post-Op Diagnosis: PAD  Procedure:   1.  Abdominal aortogram with iliac runoff  2.  Selective bilateral common femoral angiogram  3.  Successful PTA of bilateral common iliac arteries assisted by shockwave lithotripsy    Anesthesia: Local  EBL: 5mL  Specimens:  None    There was significant gradient of almost 35 mmHg due to severe left common iliac stenosis.  Left common femoral artery pressure before angioplasty was 93/53 with right common femoral artery pressure 108 x 57    Abdominal aortogram was performed which showed severe stenosis in the left common the artery with severe calcification.  Right common femoral artery had moderate stenosis.  Bilateral 7 Khmer sheaths were placed in bilateral common femoral arteries.    The lesion was easily crossed with the glide advantage wire.  Subsequently predilation was performed with a 4.0 compliant balloon on each side  After which shockwave balloon lithotripsy was performed with a 7.0 x 60 balloon with good expansion.  Subsequently 8.0x27 mm Visipro was deployed on the left side and 8.0x37m Visipro was deployed on the right side with good angiographic results.     Bilateral runoff was performed which was without evidence of embolization with known severe disease in the left SFA which will be treated in a staged fashion.     Bilateral perclose was used for hemostasis in the common femoral artery.     Patient tolerated the procedure well without complications.     Complications: None  Disposition:  Patient tolerated the procedure well    This document has been electronically signed by Matti Barnard MD on February 2, 2022 14:19 CST

## 2022-02-02 NOTE — H&P
"  [facility]  OFFICE NOTE    Cardiovascular Medicine  Matti Barnard M.D., RPVI         Matti Barnard MD  [address] [place] [address] [address] [place] [address]    Thank you for asking me to see Nikhil Hernadez for PAD.    Coronary Artery Disease      This is a 61 y.o. male with:    1. Coronary artery disease involving coronary bypass graft of native heart without angina pectoris    2. PVD (peripheral vascular disease) with claudication (CMS/HCC)    3. Essential hypertension    4. Pure hypercholesterolemia    5. Nicotine abuse          Chief complaint -CAD status post CABG, PVD     History of present Illness- 61 y.o.-year-old gentleman was history of coronary disease status post two-vessel bypass in 1995 at [facility] in the setting of an acute MI , He smokes about a pack-a-day for many years, and he drinks. He had total occlusion of the left SFA which was treated with \"atherectomy and drug-coated balloon by Dr. Acosta.  He is on Plavix and aspirin.     His main complaint at this time is claudications in the left calf, he had ABIs done since last visit showed severely reduced, has been started on cilostazol with some improvement however he still having significant symptoms and need to stop frequently.  His ABIs did showed biphasic waveforms in the femoral.  Patient underwent peripheral angiogram which showed severe stenosis in his left SFA and left common iliac artery with mild to moderate disease on the right common iliac artery.    Patient is here for staged intervention of bilateral iliac arteries      Review of Systems - ROS  Constitution: Negative for weakness, weight gain and weight loss.   HENT: Negative for congestion.    Eyes: Negative for blurred vision.   Cardiovascular: As mentioned above  Respiratory: Negative for cough and hemoptysis.    Endocrine: Negative for polydipsia and polyuria.   Hematologic/Lymphatic: Negative for bleeding problem. Does not " "bruise/bleed easily.   Skin: Negative for flushing.   Musculoskeletal: Negative for neck pain and stiffness.   Gastrointestinal: Negative for abdominal pain, diarrhea, jaundice, melena, nausea and vomiting.   Genitourinary: Negative for dysuria and hematuria.   Neurological: Negative for dizziness, focal weakness and numbness.   Psychiatric/Behavioral: Negative for altered mental status and depression.          All other systems were reviewed and were negative.    family history includes Cancer in his father and mother.     reports that he has been smoking cigarettes. He has been smoking about 1.00 pack per day. He has never used smokeless tobacco. He reports current alcohol use of about 15.0 standard drinks of alcohol per week. He reports that he does not use drugs.    Allergies   Allergen Reactions   • Aleve [Naproxen Sodium] Itching, Swelling and Rash   • Morphine And Related Nausea And Vomiting     Current Outpatient Medications:   •  amLODIPine (NORVASC) 5 MG tablet, Take 1 tablet by mouth Daily., Disp: 90 tablet, Rfl: 3  •  aspirin 81 MG chewable tablet, Chew 81 mg Daily., Disp: , Rfl:   •  atorvastatin (LIPITOR) 20 MG tablet, Take 20 mg by mouth Every Night., Disp: , Rfl:   •  cetirizine (zyrTEC) 10 MG tablet, Take 10 mg by mouth As Needed., Disp: , Rfl:   •  cilostazol (PLETAL) 100 MG tablet, Take 1 tablet by mouth 2 (Two) Times a Day., Disp: 60 tablet, Rfl: 2  •  clopidogrel (PLAVIX) 75 MG tablet, Take 1 tablet by mouth Daily., Disp: 90 tablet, Rfl: 3  •  lisinopril (PRINIVIL,ZESTRIL) 20 MG tablet, Take 20 mg by mouth Every Night., Disp: , Rfl:   •  TOPROL XL 50 MG 24 hr tablet, Take 50 mg by mouth Every Night., Disp: , Rfl:     Physical Exam:  Vitals:    02/02/22 1025   BP: 166/81   Pulse: 61   Resp: 18   Temp: 98.6 °F (37 °C)   TempSrc: Temporal   SpO2: 99%   Weight: 93.5 kg (206 lb 2.1 oz)   Height: 170.2 cm (67\")     Current Pain Level: none  Pulse Ox: Normal  on room air  General: alert, appears stated " age and cooperative     Body Habitus: well-nourished    HEENT: Head: Normocephalic, no lesions, without obvious abnormality. No arcus senilis, xanthelasma or xanthomas.    Neuro: alert, oriented x3  Pulses: 2+ and symmetric  JVP: Volume/Pulsation: Normal.  Normal waveforms.   Appropriate inspiratory decrease.  No Kussmaul's. No Laurel's.   Carotid Exam: no bruit normal pulsation bilaterally   Carotid Volume: normal.     Respirations: no increased work of breathing   Chest:  Normal    Pulmonary:Normal   Precordium: Normal impulses. P2 is not palpable.  RV Heave: absent  LV Heave: absent  Armour:  normal size and placement  Palpable S4: absent.  Heart rate: normal    Heart Rhythm: regular     Heart Sounds: S1: normal  S2: normal  S3: absent   S4: absent  Opening Snap: absent    Pericardial Rub:  Absent: .    Abdomen:   Appearance: normal .  Palpation: Soft, non-tender to palpation, bowel sounds positive in all four quadrants; no guarding or rebound tenderness  Extremity: no edema.   LE Skin: no rashes  LE Hair:  normal  LE Pulses: well perfused with normal pulses in the distal extremities  Pallor on elevation: Absent. Rubor on dependency: None      DATA REVIEWED:     EKG. I personally reviewed and interpreted the EKG.  Normal sinus rhythm.  Nonspecific ST-T changes.  Right bundle branch block.    ECG/EMG Results (all)     None        ---------------------------------------------------  TTE/ASHTYN:  Results for orders placed in visit on 12/13/21    Adult Transthoracic Echo Complete W/ Cont if Necessary Per Protocol    Interpretation Summary  · Left ventricular ejection fraction appears to be 56 - 60%. Left ventricular systolic function is normal.  · Left atrial volume is mildly increased.  · Left ventricular diastolic function was normal.        --------------------------------------------------------------------------------------------------  LABS:     The CVD Risk score (Yonatan et al., 2008) failed to calculate  for the following reasons:    The patient has a prior MI, stroke, CHF, or peripheral vascular disease diagnosis         Lab Results   Component Value Date    GLUCOSE 90 02/02/2022    BUN 6 (L) 02/02/2022    CREATININE 0.56 (L) 02/02/2022    EGFRIFNONA 147 02/02/2022    BCR 10.7 02/02/2022    K 3.7 02/02/2022    CO2 24.0 02/02/2022    CALCIUM 9.3 02/02/2022    ALBUMIN 3.90 09/11/2018    AST 52 09/11/2018    ALT 62 09/11/2018     Lab Results   Component Value Date    WBC 7.81 02/02/2022    HGB 15.2 02/02/2022    HCT 43.4 02/02/2022    MCV 88.4 02/02/2022     02/02/2022     Lab Results   Component Value Date    CHOL 102 10/03/2018    TRIG 59 10/03/2018    HDL 38 (L) 10/03/2018    LDL 42 10/03/2018     No results found for: TSH, T2ALLFQ, J1PDNXH, THYROIDAB  No results found for: CKTOTAL, CKMB, CKMBINDEX, TROPONINI, TROPONINT  No results found for: HGBA1C  No results found for: DDIMER  Lab Results   Component Value Date    ALT 62 09/11/2018     No results found for: HGBA1C  Lab Results   Component Value Date    CREATININE 0.56 (L) 02/02/2022     No results found for: IRON, TIBC, FERRITIN  Lab Results   Component Value Date    INR 1.02 02/02/2022    INR 1.06 01/26/2022    INR 1.04 10/02/2018    PROTIME 13.3 02/02/2022    PROTIME 13.7 01/26/2022    PROTIME 13.4 10/02/2018       Assessment/Plan     Pre-Op:    Invasive peripheral angiography and angioplasty was recommended to the patient.  The patientdenies  bleeding issues. The patient reports use of antiplatelet agents.  The patient denies  CKD. The patient denies  contrast allergy. The patient denies  use of diabetes medications.      The indications, risks/benefits and alternatives of peripheral angiogram, angioplasty, conscious sedation, and possible blood transfusion were discussed in detail with the patient. The potential complications of 1/2000 chance of death, 1/1000 chance of heart attack or stroke, 1/500 chance of bleeding or clotting of the femoral artery,  and 1/500 chance of allergic reaction to contrast were discussed.  With his severely calcified iliac arteries increased risk of perforation, dissection and distal embolization with need for emergent surgical repair were also explained to the patient.   No contraindications were found. The patient  appeared to understand and agreed to the above.  Aortography, peripheral angiogram and angioplasty with shockwave balloon lithotripsy    ASA Class: III  Mallampati Score: II    Contraindications to DAPT: None          Prevention:  Patient's Body mass index is 32.28 kg/m². BMI is above normal parameters. Recommendations include: exercise counseling and nutrition counseling.      Patient reported he has stopped smoking.         AAA Screening:   Check after he turned 63.            This document has been electronically signed by Matti Barnard MD on February 2, 2022 11:38 CST

## 2022-02-03 VITALS
HEART RATE: 54 BPM | HEIGHT: 67 IN | BODY MASS INDEX: 32.33 KG/M2 | OXYGEN SATURATION: 96 % | SYSTOLIC BLOOD PRESSURE: 153 MMHG | WEIGHT: 206 LBS | RESPIRATION RATE: 18 BRPM | DIASTOLIC BLOOD PRESSURE: 77 MMHG | TEMPERATURE: 96.8 F

## 2022-02-03 LAB
ANION GAP SERPL CALCULATED.3IONS-SCNC: 7 MMOL/L (ref 5–15)
BUN SERPL-MCNC: 6 MG/DL (ref 8–23)
BUN/CREAT SERPL: 10.3 (ref 7–25)
CALCIUM SPEC-SCNC: 8.9 MG/DL (ref 8.6–10.5)
CHLORIDE SERPL-SCNC: 101 MMOL/L (ref 98–107)
CO2 SERPL-SCNC: 28 MMOL/L (ref 22–29)
CREAT SERPL-MCNC: 0.58 MG/DL (ref 0.76–1.27)
DEPRECATED RDW RBC AUTO: 41.8 FL (ref 37–54)
ERYTHROCYTE [DISTWIDTH] IN BLOOD BY AUTOMATED COUNT: 13.4 % (ref 12.3–15.4)
GFR SERPL CREATININE-BSD FRML MDRD: 142 ML/MIN/1.73
GLUCOSE SERPL-MCNC: 98 MG/DL (ref 65–99)
HCT VFR BLD AUTO: 39.4 % (ref 37.5–51)
HGB BLD-MCNC: 13.9 G/DL (ref 13–17.7)
MCH RBC QN AUTO: 30.4 PG (ref 26.6–33)
MCHC RBC AUTO-ENTMCNC: 35.3 G/DL (ref 31.5–35.7)
MCV RBC AUTO: 86.2 FL (ref 79–97)
PLATELET # BLD AUTO: 205 10*3/MM3 (ref 140–450)
PMV BLD AUTO: 9.9 FL (ref 6–12)
POTASSIUM SERPL-SCNC: 3.9 MMOL/L (ref 3.5–5.2)
RBC # BLD AUTO: 4.57 10*6/MM3 (ref 4.14–5.8)
SODIUM SERPL-SCNC: 136 MMOL/L (ref 136–145)
WBC NRBC COR # BLD: 5.96 10*3/MM3 (ref 3.4–10.8)

## 2022-02-03 PROCEDURE — 80048 BASIC METABOLIC PNL TOTAL CA: CPT | Performed by: INTERNAL MEDICINE

## 2022-02-03 PROCEDURE — 99217 PR OBSERVATION CARE DISCHARGE MANAGEMENT: CPT | Performed by: INTERNAL MEDICINE

## 2022-02-03 PROCEDURE — 93010 ELECTROCARDIOGRAM REPORT: CPT | Performed by: INTERNAL MEDICINE

## 2022-02-03 PROCEDURE — 93005 ELECTROCARDIOGRAM TRACING: CPT | Performed by: INTERNAL MEDICINE

## 2022-02-03 PROCEDURE — 94799 UNLISTED PULMONARY SVC/PX: CPT

## 2022-02-03 PROCEDURE — 85027 COMPLETE CBC AUTOMATED: CPT | Performed by: INTERNAL MEDICINE

## 2022-02-03 PROCEDURE — G0378 HOSPITAL OBSERVATION PER HR: HCPCS

## 2022-02-03 RX ADMIN — CETIRIZINE HYDROCHLORIDE 10 MG: 10 TABLET, FILM COATED ORAL at 07:49

## 2022-02-03 RX ADMIN — ASPIRIN 81 MG: 81 TABLET, CHEWABLE ORAL at 07:49

## 2022-02-03 NOTE — DISCHARGE INSTRUCTIONS
Endovascular Therapy for Peripheral Vascular Disease, Care After  The following information offers guidance on how to care for yourself after your procedure. Your health care provider may also give you more specific instructions. If you have problems or questions, contact your health care provider.  What can I expect after the procedure?  After the procedure, it is common to have:  · Pain.  · Soreness and bruising around your puncture or incision (access site).  · Fatigue.  Follow these instructions at home:  Access site care    · Follow instructions from your health care provider about how to take care of your access site. Make sure you:  ? Wash your hands with soap and water for at least 20 seconds before and after you change your bandage (dressing). If soap and water are not available, use hand .  ? Change your dressing as told by your health care provider.  ? Leave stitches (sutures), skin glue, or adhesive strips in place. If adhesive strip edges start to loosen and curl up, you may trim the loose edges. Do not remove adhesive strips or skin glue completely unless your health care provider tells you to do that.  · Check your access site every day for signs of infection. Check for:  ? More redness, swelling, or pain.  ? A lump or bump.  ? Fluid or blood.  ? Warmth.  ? Pus or a bad smell.    Medicines  · Take over-the-counter and prescription medicines only as told by your health care provider. You may need to take medicines to prevent blood clots and to lower your cholesterol.  · If you were prescribed an antibiotic medicine, take it as told by your health care provider. Do not stop taking the antibiotic even if you start to feel better.  Driving  Do not drive until your health care provider approves.  · If you were given a sedative during the procedure, it can affect you for several hours. Do not drive or operate machinery until your health care provider says that it is safe.  · Ask your health care  provider if the medicine prescribed to you requires you to avoid driving or using machinery.  Activity  · Rest as told by your health care provider.  · Avoid sitting for a long time without moving. Get up to take short walks every 1-2 hours. This is important to improve blood flow and breathing. Ask for help if you feel weak or unsteady.  · Do not lift anything that is heavier than 10 lb (4.5 kg), or the limit that you are told, until your health care provider says that it is safe.  · Avoid activity that requires a lot of effort, such as exercise and sports, as told by your health care provider.  · Avoid sexual activity until your health care provider says it is safe.  · Follow your exercise plan as told by your health care provider.  · Return to your normal activities as told by your health care provider. Ask your health care provider what activities are safe for you.  Eating and drinking  · Drink fluids as instructed to help flush out the dye used during the procedure.  · Follow instructions from your health care provider about eating or drinking restrictions. You may need to eat a diet that is low in salt (sodium) and fat.  · Avoid drinking alcohol.  General instructions  · Do not take baths, swim, or use a hot tub until your health care provider approves. Ask your health care provider if you may take showers. You may only be allowed to take sponge baths.  · Do not use any products that contain nicotine or tobacco. These products include cigarettes, chewing tobacco, and vaping devices, such as e-cigarettes. If you need help quitting, ask your health care provider.  · Keep all follow-up visits. This is important.  Contact a health care provider if:  · You have a fever.  · You have severe pain that does not get better with medicine.  · You have more redness, swelling, or pain around your access site.  · You have a lump or bump at your access site.  Get help right away if:    · You have fluid or blood coming from  "your access site. If this happens, lie down on your back and apply pressure to the area.  · You have chest pain.  · You have problems breathing.  · You have pain, numbness, or tingling in your legs.  · You faint.  · You have any symptoms of a stroke. \"BE FAST\" is an easy way to remember the main warning signs of a stroke:  ? B - Balance. Signs are dizziness, sudden trouble walking, or loss of balance.  ? E - Eyes. Signs are trouble seeing or a sudden change in vision.  ? F - Face. Signs are sudden weakness or numbness of the face, or the face or eyelid drooping on one side.  ? A - Arms. Signs are weakness or numbness in an arm. This happens suddenly and usually on one side of the body.  ? S - Speech. Signs are sudden trouble speaking, slurred speech, or trouble understanding what people say.  ? T - Time. Time to call emergency services. Write down what time symptoms started.  · You have other signs of a stroke, such as:  ? A sudden, severe headache with no known cause.  ? Nausea or vomiting.  ? Seizure.  These symptoms may represent a serious problem that is an emergency. Do not wait to see if the symptoms will go away. Get medical help right away. Call your local emergency services (911 in the [place] Do not drive yourself to the hospital.  Summary  · After the procedure, it is common to have pain and soreness near your puncture or incision (access site).  · Check your access site every day for signs of infection, such as redness, swelling, or pain.  · You may need to take medicines to prevent blood clots and to lower your cholesterol.  · If you have any signs of a stroke, get help right away.  This information is not intended to replace advice given to you by your health care provider. Make sure you discuss any questions you have with your health care provider.  Document Revised: 06/21/2021 Document Reviewed: 06/21/2021  Elsevier Patient Education © 2021 [employer]  Managing the Challenge of Quitting " Smoking  Quitting smoking is a physical and mental challenge. You will face cravings, withdrawal symptoms, and temptation. Before quitting, work with your health care provider to make a plan that can help you manage quitting. Preparation can help you quit and keep you from giving in.  How to manage lifestyle changes  Managing stress  Stress can make you want to smoke, and wanting to smoke may cause stress. It is important to find ways to manage your stress. You might try some of the following:  · Practice relaxation techniques.  ? Breathe slowly and deeply, in through your nose and out through your mouth.  ? Listen to music.  ? Soak in a bath or take a shower.  ? Imagine a peaceful place or vacation.  · Get some support.  ? Talk with family or friends about your stress.  ? Join a support group.  ? Talk with a counselor or therapist.  · Get some physical activity.  ? Go for a walk, run, or bike ride.  ? Play a favorite sport.  ? Practice yoga.    Medicines  Talk with your health care provider about medicines that might help you deal with cravings and make quitting easier for you.  Relationships  Social situations can be difficult when you are quitting smoking. To manage this, you can:  · Avoid parties and other social situations where people might be smoking.  · Avoid alcohol.  · Leave right away if you have the urge to smoke.  · Explain to your family and friends that you are quitting smoking. Ask for support and let them know you might be a bit grumpy.  · Plan activities where smoking is not an option.  General instructions  Be aware that many people gain weight after they quit smoking. However, not everyone does. To keep from gaining weight, have a plan in place before you quit and stick to the plan after you quit. Your plan should include:  · Having healthy snacks. When you have a craving, it may help to:  ? Eat popcorn, carrots, celery, or other cut vegetables.  ? Chew sugar-free gum.  · Changing how you  eat.  ? Eat small portion sizes at meals.  ? Eat 4-6 small meals throughout the day instead of 1-2 large meals a day.  ? Be mindful when you eat. Do not watch television or do other things that might distract you as you eat.  · Exercising regularly.  ? Make time to exercise each day. If you do not have time for a long workout, do short bouts of exercise for 5-10 minutes several times a day.  ? Do some form of strengthening exercise, such as weight lifting.  ? Do some exercise that gets your heart beating and causes you to breathe deeply, such as walking fast, running, swimming, or biking. This is very important.  · Drinking plenty of water or other low-calorie or no-calorie drinks. Drink 6-8 glasses of water daily.    How to recognize withdrawal symptoms  Your body and mind may experience discomfort as you try to get used to not having nicotine in your system. These effects are called withdrawal symptoms. They may include:  · Feeling hungrier than normal.  · Having trouble concentrating.  · Feeling irritable or restless.  · Having trouble sleeping.  · Feeling depressed.  · Craving a cigarette.  To manage withdrawal symptoms:  · Avoid places, people, and activities that trigger your cravings.  · Remember why you want to quit.  · Get plenty of sleep.  · Avoid coffee and other caffeinated drinks. These may worsen some of your symptoms.  These symptoms may surprise you. But be assured that they are normal to have when quitting smoking.  How to manage cravings  Come up with a plan for how to deal with your cravings. The plan should include the following:  · A definition of the specific situation you want to deal with.  · An alternative action you will take.  · A clear idea for how this action will help.  · The name of someone who might help you with this.  Cravings usually last for 5-10 minutes. Consider taking the following actions to help you with your plan to deal with cravings:  · Keep your mouth busy.  ? Chew  sugar-free gum.  ? Suck on hard candies or a straw.  ? Brush your teeth.  · Keep your hands and body busy.  ? Change to a different activity right away.  ? Squeeze or play with a ball.  ? Do an activity or a hobby, such as making bead jewelry, practicing needlepoint, or working with wood.  ? Mix up your normal routine.  ? Take a short exercise break. Go for a quick walk or run up and down stairs.  · Focus on doing something kind or helpful for someone else.  · Call a friend or family member to talk during a craving.  · Join a support group.  · Contact a quitline.  Where to find support  To get help or find a support group:  · Call the National [employer]'s Smoking Quitline: 2-058-QUIT NOW (968-7237)  · Visit the website of the Substance Abuse and Mental Health Services Administration: www.samhsa.gov  · Text QUIT to SmokefreeTXT: 057155  Where to find more information  Visit these websites to find more information on quitting smoking:  · National [employer]: www.smokefree.gov  · American Lung Association: www.lung.org  · American Cancer Society: www.cancer.org  · Centers for Disease Control and Prevention: www.cdc.gov  · American Heart Association: www.heart.org  Contact a health care provider if:  · You want to change your plan for quitting.  · The medicines you are taking are not helping.  · Your eating feels out of control or you cannot sleep.  Get help right away if:  · You feel depressed or become very anxious.  Summary  · Quitting smoking is a physical and mental challenge. You will face cravings, withdrawal symptoms, and temptation to smoke again. Preparation can help you as you go through these challenges.  · Try different techniques to manage stress, handle social situations, and prevent weight gain.  · You can deal with cravings by keeping your mouth busy (such as by chewing gum), keeping your hands and body busy, calling family or friends, or contacting a quitline for people who want to quit  smoking.  · You can deal with withdrawal symptoms by avoiding places where people smoke, getting plenty of rest, and avoiding drinks with caffeine.  This information is not intended to replace advice given to you by your health care provider. Make sure you discuss any questions you have with your health care provider.  Document Revised: 10/06/2020 Document Reviewed: 10/06/2020  [employer] Patient Education © 2021 [employer]  Femoral Site Care  This sheet gives you information about how to care for yourself after your procedure. Your health care provider may also give you more specific instructions. If you have problems or questions, contact your health care provider.  What can I expect after the procedure?  After the procedure, it is common to have:  · Bruising that usually fades within 1-2 weeks.  · Tenderness at the site.  Follow these instructions at home:  Wound care  · Follow instructions from your health care provider about how to take care of your insertion site. Make sure you:  ? Wash your hands with soap and water before you change your bandage (dressing). If soap and water are not available, use hand .  ? Change your dressing as told by your health care provider.  ? Leave stitches (sutures), skin glue, or adhesive strips in place. These skin closures may need to stay in place for 2 weeks or longer. If adhesive strip edges start to loosen and curl up, you may trim the loose edges. Do not remove adhesive strips completely unless your health care provider tells you to do that.  · Do not take baths, swim, or use a hot tub until your health care provider approves.  · You may shower 24-48 hours after the procedure or as told by your health care provider.  ? Gently wash the site with plain soap and water.  ? Pat the area dry with a clean towel.  ? Do not rub the site. This may cause bleeding.  · Do not apply powder or lotion to the site. Keep the site clean and dry.  · Check your femoral site every day  for signs of infection. Check for:  ? Redness, swelling, or pain.  ? Fluid or blood.  ? Warmth.  ? Pus or a bad smell.  Activity  · For the first 2-3 days after your procedure, or as long as directed:  ? Avoid climbing stairs as much as possible.  ? Do not squat.  · Do not lift anything that is heavier than 10 lb (4.5 kg), or the limit that you are told, until your health care provider says that it is safe.  · Rest as directed.  ? Avoid sitting for a long time without moving. Get up to take short walks every 1-2 hours.  · Do not drive for 24 hours if you were given a medicine to help you relax (sedative).  General instructions  · Take over-the-counter and prescription medicines only as told by your health care provider.  · Keep all follow-up visits as told by your health care provider. This is important.  Contact a health care provider if you have:  · A fever or chills.  · You have redness, swelling, or pain around your insertion site.  Get help right away if:  · The catheter insertion area swells very fast.  · You pass out.  · You suddenly start to sweat or your skin gets clammy.  · The catheter insertion area is bleeding, and the bleeding does not stop when you hold steady pressure on the area.  · The area near or just beyond the catheter insertion site becomes pale, cool, tingly, or numb.  These symptoms may represent a serious problem that is an emergency. Do not wait to see if the symptoms will go away. Get medical help right away. Call your local emergency services (911 in the [place] Do not drive yourself to the hospital.  Summary  · After the procedure, it is common to have bruising that usually fades within 1-2 weeks.  · Check your femoral site every day for signs of infection.  · Do not lift anything that is heavier than 10 lb (4.5 kg), or the limit that you are told, until your health care provider says that it is safe.  This information is not intended to replace advice given to you by your health care  provider. Make sure you discuss any questions you have with your health care provider.  Document Revised: 12/31/2018 Document Reviewed: 12/31/2018  Elsevier Patient Education © 2021 Elsevier Inc.

## 2022-02-03 NOTE — NURSING NOTE
Pt resting at this time. Vital signs within normal range ex. HR continues berto in mid to upper 50's. [place] continue to monitor.

## 2022-02-03 NOTE — DISCHARGE SUMMARY
"  [facility]  INPATIENT DISCHARGE SUMMARY    Date of Discharge:  2/3/2022    Discharge Diagnosis:   PAD  HTN  HLD        Hospital Course  Patient is a 61 y.o. male presented with   history of coronary disease status post two-vessel bypass in 1995 at [facility] in the setting of an acute MI , He smokes about a pack-a-day for many years, and he drinks. He had total occlusion of the left SFA which was treated with \"atherectomy and drug-coated balloon by Dr. Acosta.  He is on Plavix and aspirin.      His main complaint at this time is claudications in the left calf, he had ABIs done since last visit showed severely reduced, has been started on cilostazol with some improvement however he still having significant symptoms and need to stop frequently.  His ABIs did showed biphasic waveforms in the femoral.  Patient underwent peripheral angiogram which showed severe stenosis in his left SFA and left common iliac artery with mild to moderate disease on the right common iliac artery.     Patient underwent successful PTA of bilateral Common iliac arteries assisted by shockwave without complications. Patient was observed overnight. Groin has healed well. Labs stable.   Patient will be discharged home and f/up in 4-6 weeks with staged PTA of left SFA  Patient was again counseled on smoking cessation       Procedures Performed  Procedure(s):  Peripheral angiography     Pre-Op Diagnosis:  PAD  Post-Op Diagnosis: PAD  Procedure:   1.  Abdominal aortogram with iliac runoff  2.  Selective bilateral common femoral angiogram  3.  Successful PTA of bilateral common iliac arteries assisted by shockwave lithotripsy     Anesthesia: Local  EBL: 5mL  Specimens:  None     There was significant gradient of almost 35 mmHg due to severe left common iliac stenosis.  Left common femoral artery pressure before angioplasty was 93/53 with right common femoral artery pressure 108 x 57     Abdominal aortogram was " performed which showed severe stenosis in the left common the artery with severe calcification.  Right common femoral artery had moderate stenosis.  Bilateral 7 Marshallese sheaths were placed in bilateral common femoral arteries.     The lesion was easily crossed with the glide advantage wire.  Subsequently predilation was performed with a 4.0 compliant balloon on each side  After which shockwave balloon lithotripsy was performed with a 7.0 x 60 balloon with good expansion.  Subsequently 8.0x27 mm Visipro was deployed on the left side and 8.0x37m Visipro was deployed on the right side with good angiographic results.      Bilateral runoff was performed which was without evidence of embolization with known severe disease in the left SFA which will be treated in a staged fashion.      Bilateral perclose was used for hemostasis in the common femoral artery.      Patient tolerated the procedure well without complications.      Complications: None  Disposition:  Patient tolerated the procedure well  Consults:   Consults     No orders found for last 30 day(s).          Pertinent Test Results:     Lab Results (last 24 hours)     Procedure Component Value Units Date/Time    Basic Metabolic Panel [[id number]]  (Abnormal) Collected: 02/03/22 0527    Specimen: Blood Updated: 02/03/22 0558     Glucose 98 mg/dL      BUN 6 mg/dL      Creatinine 0.58 mg/dL      Sodium 136 mmol/L      Potassium 3.9 mmol/L      Chloride 101 mmol/L      CO2 28.0 mmol/L      Calcium 8.9 mg/dL      eGFR Non African Amer 142 mL/min/1.73      BUN/Creatinine Ratio 10.3     Anion Gap 7.0 mmol/L     Narrative:      GFR Normal >60  Chronic Kidney Disease <60  Kidney Failure <15      CBC (No Diff) [[id number]]  (Normal) Collected: 02/03/22 0527    Specimen: Blood Updated: 02/03/22 0542     WBC 5.96 10*3/mm3      RBC 4.57 10*6/mm3      Hemoglobin 13.9 g/dL      Hematocrit 39.4 %      MCV 86.2 fL      MCH 30.4 pg      MCHC 35.3 g/dL      RDW 13.4 %      RDW-SD 41.8 fl       MPV 9.9 fL      Platelets 205 10*3/mm3     Basic Metabolic Panel [[id number]]  (Abnormal) Collected: 02/02/22 1032    Specimen: Blood Updated: 02/02/22 1110     Glucose 90 mg/dL      BUN 6 mg/dL      Creatinine 0.56 mg/dL      Sodium 137 mmol/L      Potassium 3.7 mmol/L      Comment: Slight hemolysis detected by analyzer. Results may be affected.        Chloride 102 mmol/L      CO2 24.0 mmol/L      Calcium 9.3 mg/dL      eGFR Non African Amer 147 mL/min/1.73      BUN/Creatinine Ratio 10.7     Anion Gap 11.0 mmol/L     Narrative:      GFR Normal >60  Chronic Kidney Disease <60  Kidney Failure <15      Protime-INR [[id number]]  (Normal) Collected: 02/02/22 1032    Specimen: Blood Updated: 02/02/22 1102     Protime 13.3 Seconds      INR 1.02    Narrative:      Therapeutic range for most indications is 2.0-3.0 INR,  or 2.5-3.5 for mechanical heart valves.    CBC (No Diff) [[id number]]  (Normal) Collected: 02/02/22 1032    Specimen: Blood Updated: 02/02/22 1043     WBC 7.81 10*3/mm3      RBC 4.91 10*6/mm3      Hemoglobin 15.2 g/dL      Hematocrit 43.4 %      MCV 88.4 fL      MCH 31.0 pg      MCHC 35.0 g/dL      RDW 13.6 %      RDW-SD 44.3 fl      MPV 9.9 fL      Platelets 245 10*3/mm3           Imaging Results (Last 24 Hours)     ** No results found for the last 24 hours. **          ECG/EMG Results (last 24 hours)     Procedure Component Value Units Date/Time    ECG 12 Lead [[id number]] Collected: 02/03/22 0612     Updated: 02/03/22 0630     QT Interval 502 ms      QTC Interval 471 ms     Narrative:      Test Reason : Post cath  Blood Pressure :   */*   mmHG  Vent. Rate :  53 BPM     Atrial Rate :  53 BPM     P-R Int : 190 ms          QRS Dur : 162 ms      QT Int : 502 ms       P-R-T Axes :  42 -81 101 degrees     QTc Int : 471 ms    Sinus bradycardia  Right bundle branch block  Left anterior fascicular block  ** Bifascicular block **  Anteroseptal infarct (cited on or before 13-DEC-2021)  T wave abnormality, consider  lateral ischemia  Abnormal ECG  When compared with ECG of 13-DEC-2021 12:22,  Left anterior fascicular block is now Present  Questionable change in initial forces of Anteroseptal leads  T wave inversion more evident in Lateral leads    Referred By:            Confirmed By:           Condition on Discharge:  stable    Vital Signs  Temp:  [96.8 °F (36 °C)-98.6 °F (37 °C)] 96.8 °F (36 °C)  Heart Rate:  [52-66] 54  Resp:  [18] 18  BP: (132-177)/(59-87) 153/77   AOX3, NAD  S1+S3, no added sounds  Bilateral groin without evidence of hematoma/bruit   Dopplered pulses on left side, palpable DP on right side.       Discharge Disposition  Home or Self Care    Discharge Medications     Discharge Medications      Continue These Medications      Instructions Start Date   amLODIPine 5 MG tablet  Commonly known as: NORVASC   5 mg, Oral, Every 24 Hours Scheduled      aspirin 81 MG chewable tablet   81 mg, Oral, Daily      atorvastatin 20 MG tablet  Commonly known as: LIPITOR   20 mg, Oral, Nightly      cetirizine 10 MG tablet  Commonly known as: zyrTEC   10 mg, Oral, As Needed      cilostazol 100 MG tablet  Commonly known as: PLETAL   100 mg, Oral, 2 Times Daily      clopidogrel 75 MG tablet  Commonly known as: PLAVIX   75 mg, Oral, Daily      lisinopril 20 MG tablet  Commonly known as: PRINIVIL,ZESTRIL   20 mg, Oral, Nightly      Toprol XL 50 MG 24 hr tablet  Generic drug: metoprolol succinate XL   50 mg, Oral, Nightly             Discharge Diet: Cardiac    Activity at Discharge: as tolerated    Follow-up Appointments  Future Appointments   Date Time Provider Department Center   7/25/2022  3:45 PM Matti Barnard MD [facility] None         Test Results Pending at Discharge        Matti Barnard MD      Part of this note may be an electronic transcription/translation of spoken language to printed text using the Dragon Dictation System.

## 2022-02-04 DEVICE — STNT BIL VISIPRO .035 8F37MM 80CM: Type: IMPLANTABLE DEVICE | Site: LEG | Status: FUNCTIONAL

## 2022-02-10 LAB
QT INTERVAL: 502 MS
QTC INTERVAL: 471 MS

## 2022-05-09 DIAGNOSIS — I25.810 CORONARY ARTERY DISEASE INVOLVING CORONARY BYPASS GRAFT OF NATIVE HEART WITHOUT ANGINA PECTORIS: ICD-10-CM

## 2022-05-09 RX ORDER — CLOPIDOGREL BISULFATE 75 MG/1
75 TABLET ORAL DAILY
Qty: 90 TABLET | Refills: 3 | Status: SHIPPED | OUTPATIENT
Start: 2022-05-09

## 2022-05-09 RX ORDER — ATORVASTATIN CALCIUM 20 MG/1
20 TABLET, FILM COATED ORAL NIGHTLY
Qty: 90 TABLET | Refills: 1 | Status: SHIPPED | OUTPATIENT
Start: 2022-05-09 | End: 2022-09-13

## 2022-05-09 RX ORDER — LISINOPRIL 20 MG/1
20 TABLET ORAL NIGHTLY
Qty: 90 TABLET | Refills: 1 | Status: SHIPPED | OUTPATIENT
Start: 2022-05-09 | End: 2022-09-13

## 2022-05-24 ENCOUNTER — TELEPHONE (OUTPATIENT)
Dept: CARDIOLOGY | Facility: CLINIC | Age: 62
End: 2022-05-24

## 2022-06-03 ENCOUNTER — PREP FOR SURGERY (OUTPATIENT)
Dept: OTHER | Facility: HOSPITAL | Age: 62
End: 2022-06-03

## 2022-06-03 ENCOUNTER — OFFICE VISIT (OUTPATIENT)
Dept: CARDIOLOGY | Facility: CLINIC | Age: 62
End: 2022-06-03

## 2022-06-03 ENCOUNTER — TELEPHONE (OUTPATIENT)
Dept: CARDIOLOGY | Facility: CLINIC | Age: 62
End: 2022-06-03

## 2022-06-03 VITALS
WEIGHT: 207.6 LBS | OXYGEN SATURATION: 98 % | HEART RATE: 59 BPM | SYSTOLIC BLOOD PRESSURE: 168 MMHG | BODY MASS INDEX: 32.58 KG/M2 | HEIGHT: 67 IN | DIASTOLIC BLOOD PRESSURE: 84 MMHG

## 2022-06-03 DIAGNOSIS — I10 HYPERTENSION, ESSENTIAL: Primary | ICD-10-CM

## 2022-06-03 DIAGNOSIS — I73.9 PVD (PERIPHERAL VASCULAR DISEASE) WITH CLAUDICATION: Primary | ICD-10-CM

## 2022-06-03 DIAGNOSIS — I25.10 CORONARY ARTERY DISEASE DUE TO LIPID RICH PLAQUE: ICD-10-CM

## 2022-06-03 DIAGNOSIS — I25.83 CORONARY ARTERY DISEASE DUE TO LIPID RICH PLAQUE: ICD-10-CM

## 2022-06-03 PROCEDURE — 99214 OFFICE O/P EST MOD 30 MIN: CPT | Performed by: INTERNAL MEDICINE

## 2022-06-03 RX ORDER — SODIUM CHLORIDE 0.9 % (FLUSH) 0.9 %
3 SYRINGE (ML) INJECTION EVERY 12 HOURS SCHEDULED
Status: CANCELLED | OUTPATIENT
Start: 2022-06-14

## 2022-06-03 RX ORDER — VARENICLINE TARTRATE 1 MG/1
TABLET, FILM COATED ORAL
COMMUNITY
Start: 2022-04-16 | End: 2023-01-25

## 2022-06-03 RX ORDER — SODIUM CHLORIDE 0.9 % (FLUSH) 0.9 %
10 SYRINGE (ML) INJECTION AS NEEDED
Status: CANCELLED | OUTPATIENT
Start: 2022-06-14

## 2022-06-03 RX ORDER — VARENICLINE TARTRATE 0.5 MG/1
0.5 TABLET, FILM COATED ORAL 2 TIMES DAILY
Status: ON HOLD | COMMUNITY
Start: 2022-04-16 | End: 2022-08-02

## 2022-06-03 NOTE — PROGRESS NOTES
"  [facility] Cardiology  OFFICE NOTE    Cardiovascular Medicine  Matti Barnard M.D., RPVI         No referring provider defined for this encounter.    Thank you for asking me to see Nikhil Hernadez for CAD.    Coronary Artery Disease      This is a 61 y.o. male with:    1. Coronary artery disease involving coronary bypass graft of native heart without angina pectoris    2. PVD (peripheral vascular disease) with claudication (CMS/Formerly [facility])    3. Essential hypertension    4. Pure hypercholesterolemia    5. Nicotine abuse          Chief complaint -CAD status post CABG, PVD     History of present Illness- 61 y.o.-year-old gentleman was history of coronary disease status post two-vessel bypass in 1995 at [facility] in the setting of an acute MI , He smokes about a pack-a-day for many years, and he used to drink pretty heavy. He had total occlusion of the left SFA which was treated with \"atherectomy and drug-coated balloon by Dr. Acosta.  He is on Plavix and aspirin.     02/01/2021  No acute issues since last visit.  Continues to remain chest pain-free.  Denying any claudication.  Has been compliant with his aspirin Plavix without any bleeding problems.  No other acute complaints.  He has been pretty active at work and can climb 5 flights of stairs without any limitations from chest pain or claudications.      01/17/2022:  His main complaint at this time is claudications in the left calf, he had ABIs done since last visit showed severely reduced, has been started on cilostazol with some improvement however he still having significant symptoms and need to stop frequently    06/03/2022:  Underewnt bilateral iliac stenting       Review of Systems - ROS  Constitution: Negative for weakness, weight gain and weight loss.   HENT: Negative for congestion.    Eyes: Negative for blurred vision.   Cardiovascular: As mentioned above  Respiratory: Negative for cough and hemoptysis.    Endocrine: Negative for " polydipsia and polyuria.   Hematologic/Lymphatic: Negative for bleeding problem. Does not bruise/bleed easily.   Skin: Negative for flushing.   Musculoskeletal: Negative for neck pain and stiffness.   Gastrointestinal: Negative for abdominal pain, diarrhea, jaundice, melena, nausea and vomiting.   Genitourinary: Negative for dysuria and hematuria.   Neurological: Negative for dizziness, focal weakness and numbness.   Psychiatric/Behavioral: Negative for altered mental status and depression.          All other systems were reviewed and were negative.    family history includes Cancer in his father and mother.     reports that he has been smoking cigarettes. He has been smoking about 1.00 pack per day. He has never used smokeless tobacco. He reports current alcohol use of about 15.0 standard drinks of alcohol per week. He reports that he does not use drugs.    Allergies   Allergen Reactions   • Aleve [Naproxen Sodium] Itching, Swelling and Rash   • Morphine And Related Nausea And Vomiting         Current Outpatient Medications:   •  amLODIPine (NORVASC) 5 MG tablet, Take 1 tablet by mouth Daily., Disp: 90 tablet, Rfl: 3  •  aspirin 81 MG chewable tablet, Chew 81 mg Daily., Disp: , Rfl:   •  atorvastatin (LIPITOR) 20 MG tablet, Take 1 tablet by mouth Every Night., Disp: 90 tablet, Rfl: 1  •  cetirizine (zyrTEC) 10 MG tablet, Take 10 mg by mouth As Needed., Disp: , Rfl:   •  cilostazol (PLETAL) 100 MG tablet, Take 1 tablet by mouth 2 (Two) Times a Day., Disp: 60 tablet, Rfl: 2  •  clopidogrel (PLAVIX) 75 MG tablet, Take 1 tablet by mouth Daily., Disp: 90 tablet, Rfl: 3  •  lisinopril (PRINIVIL,ZESTRIL) 20 MG tablet, Take 1 tablet by mouth Every Night., Disp: 90 tablet, Rfl: 1  •  TOPROL XL 50 MG 24 hr tablet, Take 50 mg by mouth Every Night., Disp: , Rfl:   •  varenicline (CHANTIX) 0.5 MG tablet, , Disp: , Rfl:   •  varenicline (CHANTIX) 1 MG tablet, , Disp: , Rfl:     Physical Exam:  Vitals:    06/03/22 0757   BP:  "168/84   BP Location: Left arm   Patient Position: Sitting   Cuff Size: Adult   Pulse: 59   SpO2: 98%   Weight: 94.2 kg (207 lb 9.6 oz)   Height: 170.2 cm (67\")   PainSc: 0-No pain     Current Pain Level: none  Pulse Ox: Normal  on room air  General: alert, appears stated age and cooperative     Body Habitus: well-nourished    HEENT: Head: Normocephalic, no lesions, without obvious abnormality. No arcus senilis, xanthelasma or xanthomas.    Neuro: alert, oriented x3  Pulses: 2+ and symmetric  JVP: Volume/Pulsation: Normal.  Normal waveforms.   Appropriate inspiratory decrease.  No Kussmaul's. No Laurel's.   Carotid Exam: no bruit normal pulsation bilaterally   Carotid Volume: normal.     Respirations: no increased work of breathing   Chest:  Normal    Pulmonary:Normal   Precordium: Normal impulses. P2 is not palpable.  RV Heave: absent  LV Heave: absent  Whately:  normal size and placement  Palpable S4: absent.  Heart rate: normal    Heart Rhythm: regular     Heart Sounds: S1: normal  S2: normal  S3: absent   S4: absent  Opening Snap: absent    Pericardial Rub:  Absent: .    Abdomen:   Appearance: normal .  Palpation: Soft, non-tender to palpation, bowel sounds positive in all four quadrants; no guarding or rebound tenderness  Extremity: no edema.   LE Skin: no rashes  LE Hair:  normal  LE Pulses: well perfused with normal pulses in the distal extremities  Pallor on elevation: Absent. Rubor on dependency: None      DATA REVIEWED:     EKG. I personally reviewed and interpreted the EKG.  Normal sinus rhythm.  Nonspecific ST-T changes.  Right bundle branch block.    ECG/EMG Results (all)     None        ---------------------------------------------------  TTE/ASHTYN:  Results for orders placed in visit on 12/13/21    Adult Transthoracic Echo Complete W/ Cont if Necessary Per Protocol    Interpretation Summary  · Left ventricular ejection fraction appears to be 56 - 60%. Left ventricular systolic function is normal.  · " Left atrial volume is mildly increased.  · Left ventricular diastolic function was normal.        --------------------------------------------------------------------------------------------------  LABS:     The CVD Risk score (Yonatan, et al., 2008) failed to calculate for the following reasons:    The patient has a prior MI, stroke, CHF, or peripheral vascular disease diagnosis         Lab Results   Component Value Date    GLUCOSE 98 02/03/2022    BUN 6 (L) 02/03/2022    CREATININE 0.58 (L) 02/03/2022    EGFRIFNONA 142 02/03/2022    BCR 10.3 02/03/2022    K 3.9 02/03/2022    CO2 28.0 02/03/2022    CALCIUM 8.9 02/03/2022    ALBUMIN 3.90 09/11/2018    AST 52 09/11/2018    ALT 62 09/11/2018     Lab Results   Component Value Date    WBC 5.96 02/03/2022    HGB 13.9 02/03/2022    HCT 39.4 02/03/2022    MCV 86.2 02/03/2022     02/03/2022     Lab Results   Component Value Date    CHOL 102 10/03/2018    TRIG 59 10/03/2018    HDL 38 (L) 10/03/2018    LDL 42 10/03/2018     No results found for: TSH, G0YOPWH, C5JMCPP, THYROIDAB  No results found for: CKTOTAL, CKMB, CKMBINDEX, TROPONINI, TROPONINT  No results found for: HGBA1C  No results found for: DDIMER  Lab Results   Component Value Date    ALT 62 09/11/2018     No results found for: HGBA1C  Lab Results   Component Value Date    CREATININE 0.58 (L) 02/03/2022     No results found for: IRON, TIBC, FERRITIN  Lab Results   Component Value Date    INR 1.02 02/02/2022    INR 1.06 01/26/2022    INR 1.04 10/02/2018    PROTIME 13.3 02/02/2022    PROTIME 13.7 01/26/2022    PROTIME 13.4 10/02/2018       Assessment/Plan     CAD status post CABG with 2 vessel bypass in 1995  with long-standing history of tobacco use and alcohol abuse.  Stress test showed  intermediate risk study with small to medium ischemia in the inferior wall in 2018.  However patient has been asymptomatic.  Has been treated with conservative therapy.  We will continue medical management for now.  If  patient were to have symptoms of chest pain and will consider cardiac cath for further evaluation.  Continue aspirin Plavix.    Hyperlipidemia: LDL is less than 70 on Lipitor 20 mg.  Will need repeat blood work.    Peripheral vascular disease -status post PTA to the left SFA and now bilateral iliac stenting. Severe residual disease in left SFA.  Pulses are weak on the left DP, PT is not palpable.  +2 pulses on the right side.  We will plan on staged intervention of the left SFA in a week.  Continue asa/plavix/cilostazole  He is working on stopping smoking.    Hypertension/hyperlipidemia on atorvastatin and lisinopril with Toprol-XL and amlodipine.        Prevention:  Patient's Body mass index is 32.51 kg/m². BMI is above normal parameters. Recommendations include: exercise counseling and nutrition counseling.             AAA Screening:   Check after he turned 63.            This document has been electronically signed by Matti Barnard MD on Perla 3, 2022 08:06 CDT

## 2022-06-03 NOTE — TELEPHONE ENCOUNTER
Grace left asking the patient to call the office regarding his procedure that has been scheduled.

## 2022-06-06 ENCOUNTER — TELEPHONE (OUTPATIENT)
Dept: CARDIOLOGY | Facility: CLINIC | Age: 62
End: 2022-06-06

## 2022-06-06 NOTE — TELEPHONE ENCOUNTER
Patient has been scheduled peripheral angiography on June 14. All instructions discussed with the patient. He was asked to hold pletal the day of the procedure. Mr. Hernadez was instructed he needed a  as well.

## 2022-06-09 ENCOUNTER — TELEPHONE (OUTPATIENT)
Dept: CARDIOLOGY | Facility: CLINIC | Age: 62
End: 2022-06-09

## 2022-06-09 NOTE — TELEPHONE ENCOUNTER
----- Message from Leti Pimentel sent at 6/9/2022  1:11 PM CDT -----  Contact: [phone]  Nikhil Hernadez called stating he is needing to find out the date and time of his surgery so he can line up a . Return number is [phone]. Thanks.     Patient instructed his heart cath is scheduled for June 14. sds the afternoon before and let him know what time to arrive.

## 2022-07-06 ENCOUNTER — TELEPHONE (OUTPATIENT)
Dept: CARDIOLOGY | Facility: CLINIC | Age: 62
End: 2022-07-06

## 2022-07-06 NOTE — TELEPHONE ENCOUNTER
Attempted to contact patient to reschedule peripheral angiogram. Voicemail left asking him to contact the office.

## 2022-07-06 NOTE — TELEPHONE ENCOUNTER
Attempted to contact patient to reschedule peripheral angiogram. Voicemail left asking him to call the office.

## 2022-07-08 ENCOUNTER — TELEPHONE (OUTPATIENT)
Dept: CARDIOLOGY | Facility: CLINIC | Age: 62
End: 2022-07-08

## 2022-07-08 NOTE — TELEPHONE ENCOUNTER
Patient scheduled for peripheral angiogram on august 2nd. Patient was asked to hold pletal the day of his pocedure. All instructions were discussed with the patient.

## 2022-08-01 PROCEDURE — S0260 H&P FOR SURGERY: HCPCS | Performed by: INTERNAL MEDICINE

## 2022-08-01 NOTE — H&P
"  [facility] Cardiology  HISTORY AND PHYSICAL  [facility]  61 y.o. male    Chief complaint -  Claudications    History of Present Illness:      This is a 61 y.o. male with:     1. Coronary artery disease involving coronary bypass graft of native heart without angina pectoris    2. PVD (peripheral vascular disease) with claudication (CMS/HCC)    3. Essential hypertension    4. Pure hypercholesterolemia    5. Nicotine abuse          Chief complaint -CAD status post CABG, PVD     History of present Illness- 61 y.o.-year-old gentleman was history of coronary disease status post two-vessel bypass in 1995 at [facility] in the setting of an acute MI , He smokes about a pack-a-day for many years, and he used to drink pretty heavy. He had total occlusion of the left SFA which was treated with \"atherectomy and drug-coated balloon by Dr. Acosta.  He is on Plavix and aspirin.     Was found to have progression of disease and underwent bilateral iliac stenting.  Has severe residual disease in the left SFA for which he is here for staged intervention.            Allergies   Allergen Reactions   • Aleve [Naproxen Sodium] Itching, Swelling and Rash   • Morphine And Related Nausea And Vomiting         Past Medical History:   Diagnosis Date   • Gallstones    • Hyperlipidemia    • Hypertension    • Myocardial infarction (HCC) 1994         Past Surgical History:   Procedure Laterality Date   • ARTERIAL BYPASS SURGERY  1995   • CARDIAC CATHETERIZATION N/A 1/26/2022    Procedure: Peripheral angiography with Angioplasty;  Surgeon: Matti Barnard MD;  Location: [facility] CATH INVASIVE LOCATION;  Service: Cardiovascular;  Laterality: N/A;   • CARDIAC CATHETERIZATION N/A 2/2/2022    Procedure: Peripheral angiography;  Surgeon: Juan Carlos Estevez MD;  Location: [facility] CATH INVASIVE LOCATION;  Service: Cardiovascular;  Laterality: N/A;   • CORONARY ARTERY BYPASS GRAFT  1995   • INTERVENTIONAL RADIOLOGY PROCEDURE N/A " 10/2/2018    Procedure: Abdominal Aortogram;  Surgeon: Dave Garza MD;  Location: [facility] INVASIVE LOCATION;  Service: Cardiology   • TOTAL HIP ARTHROPLASTY Right 12/03/2018         Family History   Problem Relation Age of Onset   • Cancer Mother    • Cancer Father          Social History     Socioeconomic History   • Marital status:    Tobacco Use   • Smoking status: Light Tobacco Smoker     Packs/day: 0.25     Types: Cigarettes   • Smokeless tobacco: Never Used   Substance and Sexual Activity   • Alcohol use: Yes     Alcohol/week: 15.0 standard drinks     Types: 15 Shots of liquor per week     Comment: whiskey 2-3 daily   • Drug use: No   • Sexual activity: Defer         Prior to Admission medications    Medication Sig Start Date End Date Taking? Authorizing Provider   amLODIPine (NORVASC) 5 MG tablet Take 1 tablet by mouth Daily. 2/1/21   Matti Barnard MD   aspirin 81 MG chewable tablet Chew 81 mg Daily.    Aisha Mcclure MD   atorvastatin (LIPITOR) 20 MG tablet Take 1 tablet by mouth Every Night. 5/9/22   Matti Barnard MD   cetirizine (zyrTEC) 10 MG tablet Take 10 mg by mouth As Needed.    ProviderAisha MD   cilostazol (PLETAL) 100 MG tablet Take 1 tablet by mouth 2 (Two) Times a Day. 12/30/21   Matti Barnard MD   clopidogrel (PLAVIX) 75 MG tablet Take 1 tablet by mouth Daily. 5/9/22   Matti Barnard MD   lisinopril (PRINIVIL,ZESTRIL) 20 MG tablet Take 1 tablet by mouth Every Night. 5/9/22   Matti Barnard MD   TOPROL XL 50 MG 24 hr tablet Take 50 mg by mouth Every Night. 8/6/18   Aisha Mcclure MD   varenicline (CHANTIX) 0.5 MG tablet  4/16/22   Aisha Mcclure MD   varenicline (CHANTIX) 1 MG tablet  4/16/22   Aisha Mcclure MD         Review of Systems:     Constitution: Denies any fatigue, fever or chills.  HENT: Denies any headache, hearing impairment.  Eyes: Denies any blurring of vision, or photophobia.  Cardiovascular:  As per history of present  "illness.   Respiratory system: Denies any COPD, shortness of breath.  Endocrine:  No history of hyperlipidemia, diabetes.  Musculoskeletal:  No history of arthritis with musculoskeletal problems.  Gastrointestinal: No nausea, vomiting, or melena.  Genitourinary: No dysuria or hematuria.  Neurological: No history of seizure disorder, stroke, or memory problems.    Psychiatric/Behavioral: No history of depression, bipolar disorder or schizophrenia .    Hematological: No history of easy bruising.    ROS          OBJECTIVE:    BP (!) 189/88 (BP Location: Left arm, Patient Position: Lying)   Pulse 59   Temp 97.7 °F (36.5 °C) (Temporal)   Resp 18   Ht 170.2 cm (67\")   Wt 91.3 kg (201 lb 4.5 oz)   SpO2 99%   BMI 31.52 kg/m²       Physical Exam:   Constitutional: Patient is oriented to person, place, and time.   Skin: Warm and dry.  Well developed and nourished in no acute distress .  Head: Normocephalic and atraumatic.   Eyes: Pupils are equal, round, and reactive to light.   Neck: Neck supple. No bruit in the carotids, no elevation of JVD.  Cardiovascular: [place] in the fifth intercostal space, regular rate, and rhythm,  S1 greater than S2, no S3 or S4, no gallop.  Pulmonary/Chest: Air entry is equal on both sides.  No wheezing or crackles.  Abdominal: Soft.  No hepatosplenomegaly, bowel sounds are present.  Musculoskeletal: No kyphoscoliosis.  Neurological: Alert and oriented to person, place, and time.  Cranial nerves are intact. No motor or sensory deficit.  Extremities: No edema, no radial femoral delay.  Psychiatric: Normal mood and affect. Behavior is normal.    Diminished pulses on left PT/DP      Lab Results (last 24 hours)     Procedure Component Value Units Date/Time    CBC (No Diff) [[id number]]  (Abnormal) Collected: 08/02/22 0650    Specimen: Blood Updated: 08/02/22 0705     WBC 5.19 10*3/mm3      RBC 5.17 10*6/mm3      Hemoglobin 16.0 g/dL      Hematocrit 45.4 %      MCV 87.8 fL      MCH 30.9 pg      " MCHC 35.2 g/dL      RDW 16.4 %      RDW-SD 52.4 fl      MPV 10.4 fL      Platelets 148 10*3/mm3     Basic Metabolic Panel [[id number]]  (Abnormal) Collected: 08/02/22 0650    Specimen: Blood Updated: 08/02/22 0727     Glucose 124 mg/dL      BUN 6 mg/dL      Creatinine 0.61 mg/dL      Sodium 136 mmol/L      Potassium 3.5 mmol/L      Comment: Slight hemolysis detected by analyzer. Results may be affected.        Chloride 99 mmol/L      CO2 24.0 mmol/L      Calcium 9.3 mg/dL      BUN/Creatinine Ratio 9.8     Anion Gap 13.0 mmol/L      eGFR 107.9 mL/min/1.73      Comment: National Kidney Foundation and American Society of Nephrology (ASN) Task Force recommended calculation based on the Chronic Kidney Disease Epidemiology Collaboration (CKD-EPI) equation refit without adjustment for race.       Narrative:      GFR Normal >60  Chronic Kidney Disease <60  Kidney Failure <15      Protime-INR [[id number]]  (Normal) Collected: 08/02/22 0650    Specimen: Blood Updated: 08/02/22 0719     Protime 14.1 Seconds      INR 1.11    Narrative:      Therapeutic range for most indications is 2.0-3.0 INR,  or 2.5-3.5 for mechanical heart valves.          Results for orders placed in visit on 12/13/21    Adult Transthoracic Echo Complete W/ Cont if Necessary Per Protocol    Interpretation Summary  · Left ventricular ejection fraction appears to be 56 - 60%. Left ventricular systolic function is normal.  · Left atrial volume is mildly increased.  · Left ventricular diastolic function was normal.      The ASCVD Risk score (Jina DC Jr., et al., 2013) failed to calculate for the following reasons:    Cannot find a previous HDL lab    Cannot find a previous total cholesterol lab          A/P:      Invasive peripheral angiography was recommended to the patient.  The patientdenies  bleeding issues. The patient reports use of antiplatelet agents.  The patient denies  CKD. The patient denies  contrast allergy. The patient denies  use of diabetes  medications.      The indications, risks/benefits and alternatives of diagnostic of angiography and angioplasty, conscious sedation, and possible blood transfusion were discussed in detail with the patient. The potential complications of 1/2000 chance of death, 1/1000 chance of heart attack or stroke, 1/500 chance of bleeding or clotting of the femoral artery, and 1/500 chance of allergic reaction to contrast were discussed.   Risks of angioplasty including dissection, perforation and need for urgent vascular surgery were also explained to the patient.  The patient  appeared to understand and agreed to the above.  -Iliofemoral angiography , peripheral angiogram and angioplasty, closure device      ASA Class: III  Mallampati Score: II      Contraindications to DAPT: None      BMI is >= 30 and <35. (Class 1 Obesity). The following options were offered after discussion;: exercise counseling/recommendations      Nikhil Hernadez  reports that he has been smoking cigarettes. He has been smoking about 0.25 packs per day. He has never used smokeless tobacco.. I have educated him on the risk of diseases from using tobacco products such as cancer, COPD and heart disease.     I advised him to quit and he is willing to quit.    I spent 3  minutes counseling the patient.  Matti Barnard MD  8/2/2022  08:00 CDT      Part of this note may be an electronic transcription/translation of spoken language to printed text using the Dragon Dictation System.

## 2022-08-02 ENCOUNTER — HOSPITAL ENCOUNTER (OUTPATIENT)
Facility: HOSPITAL | Age: 62
Setting detail: HOSPITAL OUTPATIENT SURGERY
Discharge: HOME | End: 2022-08-02
Attending: INTERNAL MEDICINE | Admitting: INTERNAL MEDICINE

## 2022-08-02 ENCOUNTER — TELEPHONE (OUTPATIENT)
Dept: CARDIOLOGY | Facility: CLINIC | Age: 62
End: 2022-08-02

## 2022-08-02 VITALS
DIASTOLIC BLOOD PRESSURE: 90 MMHG | TEMPERATURE: 97 F | OXYGEN SATURATION: 99 % | SYSTOLIC BLOOD PRESSURE: 180 MMHG | RESPIRATION RATE: 20 BRPM | BODY MASS INDEX: 31.59 KG/M2 | HEIGHT: 67 IN | HEART RATE: 60 BPM | WEIGHT: 201.28 LBS

## 2022-08-02 DIAGNOSIS — R68.89 ABNORMAL ANKLE BRACHIAL INDEX (ABI): Primary | ICD-10-CM

## 2022-08-02 DIAGNOSIS — I73.9 PVD (PERIPHERAL VASCULAR DISEASE) WITH CLAUDICATION: ICD-10-CM

## 2022-08-02 LAB
ANION GAP SERPL CALCULATED.3IONS-SCNC: 13 MMOL/L (ref 5–15)
BUN SERPL-MCNC: 6 MG/DL (ref 8–23)
BUN/CREAT SERPL: 9.8 (ref 7–25)
CALCIUM SPEC-SCNC: 9.3 MG/DL (ref 8.6–10.5)
CHLORIDE SERPL-SCNC: 99 MMOL/L (ref 98–107)
CO2 SERPL-SCNC: 24 MMOL/L (ref 22–29)
CREAT SERPL-MCNC: 0.61 MG/DL (ref 0.76–1.27)
DEPRECATED RDW RBC AUTO: 52.4 FL (ref 37–54)
EGFRCR SERPLBLD CKD-EPI 2021: 107.9 ML/MIN/1.73
ERYTHROCYTE [DISTWIDTH] IN BLOOD BY AUTOMATED COUNT: 16.4 % (ref 12.3–15.4)
GLUCOSE SERPL-MCNC: 124 MG/DL (ref 65–99)
HCT VFR BLD AUTO: 45.4 % (ref 37.5–51)
HGB BLD-MCNC: 16 G/DL (ref 13–17.7)
INR PPP: 1.11 (ref 0.8–1.2)
MCH RBC QN AUTO: 30.9 PG (ref 26.6–33)
MCHC RBC AUTO-ENTMCNC: 35.2 G/DL (ref 31.5–35.7)
MCV RBC AUTO: 87.8 FL (ref 79–97)
PLATELET # BLD AUTO: 148 10*3/MM3 (ref 140–450)
PMV BLD AUTO: 10.4 FL (ref 6–12)
POTASSIUM SERPL-SCNC: 3.5 MMOL/L (ref 3.5–5.2)
PROTHROMBIN TIME: 14.1 SECONDS (ref 11.1–15.3)
RBC # BLD AUTO: 5.17 10*6/MM3 (ref 4.14–5.8)
SODIUM SERPL-SCNC: 136 MMOL/L (ref 136–145)
WBC NRBC COR # BLD: 5.19 10*3/MM3 (ref 3.4–10.8)

## 2022-08-02 PROCEDURE — 25010000002 ONDANSETRON PER 1 MG: Performed by: INTERNAL MEDICINE

## 2022-08-02 PROCEDURE — C1760 CLOSURE DEV, VASC: HCPCS | Performed by: INTERNAL MEDICINE

## 2022-08-02 PROCEDURE — 85027 COMPLETE CBC AUTOMATED: CPT | Performed by: INTERNAL MEDICINE

## 2022-08-02 PROCEDURE — 25010000002 FENTANYL CITRATE (PF) 50 MCG/ML SOLUTION: Performed by: INTERNAL MEDICINE

## 2022-08-02 PROCEDURE — C1887 CATHETER, GUIDING: HCPCS | Performed by: INTERNAL MEDICINE

## 2022-08-02 PROCEDURE — 25010000002 MIDAZOLAM PER 1 MG: Performed by: INTERNAL MEDICINE

## 2022-08-02 PROCEDURE — C1884 EMBOLIZATION PROTECT SYST: HCPCS | Performed by: INTERNAL MEDICINE

## 2022-08-02 PROCEDURE — C1769 GUIDE WIRE: HCPCS | Performed by: INTERNAL MEDICINE

## 2022-08-02 PROCEDURE — 25010000002 HEPARIN (PORCINE) PER 1000 UNITS: Performed by: INTERNAL MEDICINE

## 2022-08-02 PROCEDURE — C1894 INTRO/SHEATH, NON-LASER: HCPCS | Performed by: INTERNAL MEDICINE

## 2022-08-02 PROCEDURE — 0 IOPAMIDOL PER 1 ML: Performed by: INTERNAL MEDICINE

## 2022-08-02 PROCEDURE — 37225 PR REVSC OPN/PRQ FEM/POP W/ATHRC/ANGIOP SM VSL: CPT | Performed by: INTERNAL MEDICINE

## 2022-08-02 PROCEDURE — 85610 PROTHROMBIN TIME: CPT | Performed by: INTERNAL MEDICINE

## 2022-08-02 PROCEDURE — 80048 BASIC METABOLIC PNL TOTAL CA: CPT | Performed by: INTERNAL MEDICINE

## 2022-08-02 PROCEDURE — C2623 CATH, TRANSLUMIN, DRUG-COAT: HCPCS | Performed by: INTERNAL MEDICINE

## 2022-08-02 PROCEDURE — C1714 CATH, TRANS ATHERECTOMY, DIR: HCPCS

## 2022-08-02 PROCEDURE — 75710 ARTERY X-RAYS ARM/LEG: CPT | Performed by: INTERNAL MEDICINE

## 2022-08-02 PROCEDURE — C1725 CATH, TRANSLUMIN NON-LASER: HCPCS | Performed by: INTERNAL MEDICINE

## 2022-08-02 RX ORDER — SODIUM CHLORIDE 9 MG/ML
50 INJECTION, SOLUTION INTRAVENOUS CONTINUOUS
Status: DISCONTINUED | OUTPATIENT
Start: 2022-08-02 | End: 2022-08-02 | Stop reason: HOSPADM

## 2022-08-02 RX ORDER — ONDANSETRON 2 MG/ML
INJECTION INTRAMUSCULAR; INTRAVENOUS AS NEEDED
Status: DISCONTINUED | OUTPATIENT
Start: 2022-08-02 | End: 2022-08-02 | Stop reason: HOSPADM

## 2022-08-02 RX ORDER — ACETAMINOPHEN 325 MG/1
650 TABLET ORAL EVERY 4 HOURS PRN
Status: DISCONTINUED | OUTPATIENT
Start: 2022-08-02 | End: 2022-08-02 | Stop reason: HOSPADM

## 2022-08-02 RX ORDER — SODIUM CHLORIDE 0.9 % (FLUSH) 0.9 %
10 SYRINGE (ML) INJECTION AS NEEDED
Status: DISCONTINUED | OUTPATIENT
Start: 2022-08-02 | End: 2022-08-02 | Stop reason: HOSPADM

## 2022-08-02 RX ORDER — SODIUM CHLORIDE 9 MG/ML
125 INJECTION, SOLUTION INTRAVENOUS CONTINUOUS
Status: ACTIVE | OUTPATIENT
Start: 2022-08-02 | End: 2022-08-02

## 2022-08-02 RX ORDER — CLOPIDOGREL BISULFATE 75 MG/1
TABLET ORAL AS NEEDED
Status: DISCONTINUED | OUTPATIENT
Start: 2022-08-02 | End: 2022-08-02 | Stop reason: HOSPADM

## 2022-08-02 RX ORDER — SODIUM CHLORIDE 0.9 % (FLUSH) 0.9 %
3 SYRINGE (ML) INJECTION EVERY 12 HOURS SCHEDULED
Status: DISCONTINUED | OUTPATIENT
Start: 2022-08-02 | End: 2022-08-02 | Stop reason: HOSPADM

## 2022-08-02 RX ORDER — FENTANYL CITRATE 50 UG/ML
INJECTION, SOLUTION INTRAMUSCULAR; INTRAVENOUS AS NEEDED
Status: DISCONTINUED | OUTPATIENT
Start: 2022-08-02 | End: 2022-08-02 | Stop reason: HOSPADM

## 2022-08-02 RX ORDER — HEPARIN SODIUM 1000 [USP'U]/ML
INJECTION, SOLUTION INTRAVENOUS; SUBCUTANEOUS AS NEEDED
Status: DISCONTINUED | OUTPATIENT
Start: 2022-08-02 | End: 2022-08-02 | Stop reason: HOSPADM

## 2022-08-02 RX ORDER — LIDOCAINE HYDROCHLORIDE AND EPINEPHRINE 10; 10 MG/ML; UG/ML
INJECTION, SOLUTION INFILTRATION; PERINEURAL AS NEEDED
Status: DISCONTINUED | OUTPATIENT
Start: 2022-08-02 | End: 2022-08-02 | Stop reason: HOSPADM

## 2022-08-02 RX ORDER — MIDAZOLAM HYDROCHLORIDE 1 MG/ML
INJECTION INTRAMUSCULAR; INTRAVENOUS AS NEEDED
Status: DISCONTINUED | OUTPATIENT
Start: 2022-08-02 | End: 2022-08-02 | Stop reason: HOSPADM

## 2022-08-02 RX ORDER — LIDOCAINE HYDROCHLORIDE 20 MG/ML
INJECTION, SOLUTION INFILTRATION; PERINEURAL AS NEEDED
Status: DISCONTINUED | OUTPATIENT
Start: 2022-08-02 | End: 2022-08-02 | Stop reason: HOSPADM

## 2022-08-02 RX ADMIN — SODIUM CHLORIDE 50 ML/HR: 9 INJECTION, SOLUTION INTRAVENOUS at 06:55

## 2022-08-02 NOTE — BRIEF OP NOTE
Successful angioplasty of left SFA    Perclose right CFA.     Please refer to op not for further details

## 2022-08-02 NOTE — TELEPHONE ENCOUNTER
----- Message from Jessica Valenzuela sent at 8/2/2022  2:01 PM CDT -----  I put him on for 8/17 at 10:30 for shanda   ----- Message -----  From: Isis Joshua MA  Sent: 8/2/2022   1:55 PM CDT  To: Jessica Valenzuela    Can we get an shanda for him on aug 17? Dr. Barnard can see him at 11 so I was hoping he could get it before then. If so would you book him on and let me know? I will call him with appt. Thank you       Patient given appt on aug 17 at 10:30 for shanda and to see dr. Barnard at 11. He was advised these appt were in [place].

## 2022-08-09 ENCOUNTER — DOCUMENTATION (OUTPATIENT)
Dept: CARDIAC REHAB | Facility: HOSPITAL | Age: 62
End: 2022-08-09

## 2022-08-17 ENCOUNTER — OFFICE VISIT (OUTPATIENT)
Dept: CARDIOLOGY | Facility: CLINIC | Age: 62
End: 2022-08-17

## 2022-08-17 VITALS
SYSTOLIC BLOOD PRESSURE: 158 MMHG | HEIGHT: 67 IN | DIASTOLIC BLOOD PRESSURE: 76 MMHG | WEIGHT: 206.8 LBS | BODY MASS INDEX: 32.46 KG/M2 | OXYGEN SATURATION: 99 % | HEART RATE: 62 BPM

## 2022-08-17 DIAGNOSIS — I25.10 CORONARY ARTERY DISEASE DUE TO LIPID RICH PLAQUE: ICD-10-CM

## 2022-08-17 DIAGNOSIS — I25.83 CORONARY ARTERY DISEASE DUE TO LIPID RICH PLAQUE: ICD-10-CM

## 2022-08-17 DIAGNOSIS — I73.9 PVD (PERIPHERAL VASCULAR DISEASE) WITH CLAUDICATION: ICD-10-CM

## 2022-08-17 DIAGNOSIS — E78.2 HYPERLIPEMIA, MIXED: Primary | ICD-10-CM

## 2022-08-17 LAB
BH CV LOWER ARTERIAL LEFT ABI RATIO: 1.04
BH CV LOWER ARTERIAL LEFT DORSALIS PEDIS SYS MAX: 130
BH CV LOWER ARTERIAL LEFT POST TIBIAL SYS MAX: 134
BH CV LOWER ARTERIAL RIGHT ABI RATIO: 1.02
BH CV LOWER ARTERIAL RIGHT DORSALIS PEDIS SYS MAX: 132
BH CV LOWER ARTERIAL RIGHT POST TIBIAL SYS MAX: 124
MAXIMAL PREDICTED HEART RATE: 157 BPM
STRESS TARGET HR: 133 BPM
UPPER ARTERIAL LEFT ARM BRACHIAL SYS MAX: 128 MMHG
UPPER ARTERIAL RIGHT ARM BRACHIAL SYS MAX: 129 MMHG

## 2022-08-17 PROCEDURE — 99214 OFFICE O/P EST MOD 30 MIN: CPT | Performed by: INTERNAL MEDICINE

## 2022-08-17 RX ORDER — SILDENAFIL 25 MG/1
25 TABLET, FILM COATED ORAL DAILY PRN
Qty: 30 TABLET | Refills: 0 | Status: SHIPPED | OUTPATIENT
Start: 2022-08-17 | End: 2023-01-25

## 2022-08-17 NOTE — PROGRESS NOTES
"  [facility] Cardiology  OFFICE NOTE    Cardiovascular Medicine  Matti Barnard M.D., RPVI         No referring provider defined for this encounter.    Thank you for asking me to see Nikhil Hernadez for CAD.    This is a 61 y.o. male with:    1. Coronary artery disease involving coronary bypass graft of native heart without angina pectoris    2. PVD (peripheral vascular disease) with claudication (CMS/HCC)    3. Essential hypertension    4. Pure hypercholesterolemia    5. Nicotine abuse          Chief complaint -CAD status post CABG, PVD     History of present Illness- 61 y.o.-year-old gentleman was history of coronary disease status post two-vessel bypass in 1995 at [facility] in the setting of an acute MI , He smokes about a pack-a-day for many years, and he used to drink pretty heavy. He had total occlusion of the left SFA which was treated with \"atherectomy and drug-coated balloon by Dr. Acosta.  He is on Plavix and aspirin.     His main complaint was claudications in the left calf, he had ABIs done since last visit showed severely reduced, has been started on cilostazol with some improvement however he still having significant symptoms and need to stop frequently. Underewnt bilateral iliac stenting   Followed by left SFA  angioplasty with directional atherectomy in August 2022.    ABIs normalized on the left side.    Has been on aspirin/Plavix/cilostazol along with amlodipine and Lipitor.  He has some left hip pain.  Denies any claudications.  No bleeding problems.  The    Dysfunction.      Review of Systems - ROS  Constitution: Negative for weakness, weight gain and weight loss.   HENT: Negative for congestion.    Eyes: Negative for blurred vision.   Cardiovascular: As mentioned above  Respiratory: Negative for cough and hemoptysis.    Endocrine: Negative for polydipsia and polyuria.   Hematologic/Lymphatic: Negative for bleeding problem. Does not bruise/bleed easily.   Skin: Negative " "for flushing.   Musculoskeletal: Negative for neck pain and stiffness.   Gastrointestinal: Negative for abdominal pain, diarrhea, jaundice, melena, nausea and vomiting.   Genitourinary: Negative for dysuria and hematuria.   Neurological: Negative for dizziness, focal weakness and numbness.   Psychiatric/Behavioral: Negative for altered mental status and depression.          All other systems were reviewed and were negative.    family history includes Cancer in his father and mother.     reports that he has been smoking cigarettes. He has been smoking about 0.25 packs per day. He has never used smokeless tobacco. He reports current alcohol use of about 15.0 standard drinks of alcohol per week. He reports that he does not use drugs.    Allergies   Allergen Reactions   • Aleve [Naproxen Sodium] Itching, Swelling and Rash   • Morphine And Related Nausea And Vomiting         Current Outpatient Medications:   •  amLODIPine (NORVASC) 5 MG tablet, Take 1 tablet by mouth Daily., Disp: 90 tablet, Rfl: 3  •  aspirin 81 MG chewable tablet, Chew 81 mg Daily., Disp: , Rfl:   •  atorvastatin (LIPITOR) 20 MG tablet, Take 1 tablet by mouth Every Night., Disp: 90 tablet, Rfl: 1  •  cetirizine (zyrTEC) 10 MG tablet, Take 10 mg by mouth As Needed., Disp: , Rfl:   •  cilostazol (PLETAL) 100 MG tablet, Take 1 tablet by mouth 2 (Two) Times a Day., Disp: 60 tablet, Rfl: 2  •  clopidogrel (PLAVIX) 75 MG tablet, Take 1 tablet by mouth Daily., Disp: 90 tablet, Rfl: 3  •  lisinopril (PRINIVIL,ZESTRIL) 20 MG tablet, Take 1 tablet by mouth Every Night., Disp: 90 tablet, Rfl: 1  •  TOPROL XL 50 MG 24 hr tablet, Take 50 mg by mouth Every Night., Disp: , Rfl:   •  varenicline (CHANTIX) 1 MG tablet, , Disp: , Rfl:     Physical Exam:  Vitals:    08/17/22 1038   BP: 158/76   BP Location: Right arm   Patient Position: Sitting   Cuff Size: Adult   Pulse: 62   SpO2: 99%   Weight: 93.8 kg (206 lb 12.8 oz)   Height: 170.2 cm (67\")   PainSc: 0-No pain "     Current Pain Level: none  Pulse Ox: Normal  on room air  General: alert, appears stated age and cooperative     Body Habitus: well-nourished    HEENT: Head: Normocephalic, no lesions, without obvious abnormality. No arcus senilis, xanthelasma or xanthomas.    Neuro: alert, oriented x3  Pulses: 2+ and symmetric  JVP: Volume/Pulsation: Normal.  Normal waveforms.   Appropriate inspiratory decrease.  No Kussmaul's. No Laurel's.   Carotid Exam: no bruit normal pulsation bilaterally   Carotid Volume: normal.     Respirations: no increased work of breathing   Chest:  Normal    Pulmonary:Normal   Precordium: Normal impulses. P2 is not palpable.  RV Heave: absent  LV Heave: absent  [place]:  normal size and placement  Palpable S4: absent.  Heart rate: normal    Heart Rhythm: regular     Heart Sounds: S1: normal  S2: normal  S3: absent   S4: absent  Opening Snap: absent    Pericardial Rub:  Absent: .    Abdomen:   Appearance: normal .  Palpation: Soft, non-tender to palpation, bowel sounds positive in all four quadrants; no guarding or rebound tenderness  Extremity: no edema.   LE Skin: no rashes  LE Hair:  normal  LE Pulses: well perfused with normal pulses in the distal extremities  Pallor on elevation: Absent. Rubor on dependency: None      DATA REVIEWED:     EKG. I personally reviewed and interpreted the EKG.  Normal sinus rhythm.  Nonspecific ST-T changes.  Right bundle branch block.    ECG/EMG Results (all)     None        ---------------------------------------------------  TTE/ASHTYN:  Results for orders placed in visit on 12/13/21    Adult Transthoracic Echo Complete W/ Cont if Necessary Per Protocol    Interpretation Summary  · Left ventricular ejection fraction appears to be 56 - 60%. Left ventricular systolic function is normal.  · Left atrial volume is mildly increased.  · Left ventricular diastolic function was  normal.        --------------------------------------------------------------------------------------------------  LABS:     The CVD Risk score (Yonatan et al., 2008) failed to calculate for the following reasons:    The patient has a prior MI, stroke, CHF, or peripheral vascular disease diagnosis         Lab Results   Component Value Date    GLUCOSE 124 (H) 08/02/2022    BUN 6 (L) 08/02/2022    CREATININE 0.61 (L) 08/02/2022    EGFRIFNONA 142 02/03/2022    BCR 9.8 08/02/2022    K 3.5 08/02/2022    CO2 24.0 08/02/2022    CALCIUM 9.3 08/02/2022    ALBUMIN 3.90 09/11/2018    AST 52 09/11/2018    ALT 62 09/11/2018     Lab Results   Component Value Date    WBC 5.19 08/02/2022    HGB 16.0 08/02/2022    HCT 45.4 08/02/2022    MCV 87.8 08/02/2022     08/02/2022     Lab Results   Component Value Date    CHOL 102 10/03/2018    TRIG 59 10/03/2018    HDL 38 (L) 10/03/2018    LDL 42 10/03/2018     No results found for: TSH, E8WEHUE, Q0PKLCF, THYROIDAB  No results found for: CKTOTAL, CKMB, CKMBINDEX, TROPONINI, TROPONINT  No results found for: HGBA1C  No results found for: DDIMER  Lab Results   Component Value Date    ALT 62 09/11/2018     No results found for: HGBA1C  Lab Results   Component Value Date    CREATININE 0.61 (L) 08/02/2022     No results found for: IRON, TIBC, FERRITIN  Lab Results   Component Value Date    INR 1.11 08/02/2022    INR 1.02 02/02/2022    INR 1.06 01/26/2022    PROTIME 14.1 08/02/2022    PROTIME 13.3 02/02/2022    PROTIME 13.7 01/26/2022       Assessment/Plan     CAD status post CABG with 2 vessel bypass in 1995  with long-standing history of tobacco use and alcohol abuse.  Stress test showed  intermediate risk study with small to medium ischemia in the inferior wall in 2018.  However patient had been asymptomatic.  Has been treated with conservative therapy.  We will continue medical management for now.  If patient were to have symptoms of chest pain and will consider cardiac cath for  further evaluation.  Continue aspirin Plavix.    Hyperlipidemia: LDL is less than 70 on Lipitor 20 mg.  Will need repeat blood work.    Peripheral vascular disease -status post PTA to the left SFA and now bilateral iliac stenting.  ABIs normalized.  Continue asa/plavix/cilostazole  He is working on stopping smoking.    Hypertension/hyperlipidemia on atorvastatin and lisinopril with Toprol-XL and amlodipine.      Erectile dysfunction: Counseled again on smoking cessation.  We will give a trial of Viagra until he sees his primary care physician.      Prevention:  Patient's Body mass index is 32.39 kg/m². BMI is above normal parameters. Recommendations include: exercise counseling and nutrition counseling.             AAA Screening:   Check after he turned 63.            This document has been electronically signed by Matti Barnard MD on August 17, 2022 10:43 CDT

## 2022-08-18 ENCOUNTER — TELEPHONE (OUTPATIENT)
Dept: CARDIOLOGY | Facility: CLINIC | Age: 62
End: 2022-08-18

## 2022-08-18 NOTE — TELEPHONE ENCOUNTER
Attempted to contact patient to let him know insurance did not want to approve sildenafil. He should see pcp or urology to see if they can obtain approval.

## 2022-08-25 ENCOUNTER — TELEPHONE (OUTPATIENT)
Dept: CARDIOLOGY | Facility: CLINIC | Age: 62
End: 2022-08-25

## 2022-08-25 NOTE — TELEPHONE ENCOUNTER
----- Message from Leti Pimentel sent at 8/25/2022  8:11 AM CDT -----  Nikhil Hernadez called and would like to speak with you about an prescription. Return number is [phone]. Thanks.     Mr. Hernadez will contact his pcp or see urology regarding sildenafil rx.

## 2022-08-25 NOTE — TELEPHONE ENCOUNTER
----- Message from Leti Pimentel sent at 8/25/2022  8:11 AM CDT -----  Nikhil Hernadez called and would like to speak with you about an prescription. Return number is [phone]. Thanks.     Attempted to contact patient. Voicemail left asking him to call the office.

## 2022-09-13 RX ORDER — ATORVASTATIN CALCIUM 20 MG/1
TABLET, FILM COATED ORAL
Qty: 90 TABLET | Refills: 3 | Status: SHIPPED | OUTPATIENT
Start: 2022-09-13

## 2022-09-13 RX ORDER — LISINOPRIL 20 MG/1
TABLET ORAL
Qty: 90 TABLET | Refills: 3 | Status: SHIPPED | OUTPATIENT
Start: 2022-09-13

## 2023-01-24 ENCOUNTER — TELEPHONE (OUTPATIENT)
Dept: CARDIOLOGY | Facility: CLINIC | Age: 63
End: 2023-01-24
Payer: COMMERCIAL

## 2023-01-25 ENCOUNTER — OFFICE VISIT (OUTPATIENT)
Dept: CARDIOLOGY | Facility: CLINIC | Age: 63
End: 2023-01-25
Payer: COMMERCIAL

## 2023-01-25 VITALS
OXYGEN SATURATION: 98 % | HEIGHT: 67 IN | SYSTOLIC BLOOD PRESSURE: 158 MMHG | HEART RATE: 59 BPM | WEIGHT: 202.9 LBS | DIASTOLIC BLOOD PRESSURE: 84 MMHG | BODY MASS INDEX: 31.84 KG/M2

## 2023-01-25 DIAGNOSIS — Z01.810 PREOPERATIVE CARDIOVASCULAR EXAMINATION: Primary | ICD-10-CM

## 2023-01-25 PROCEDURE — 93000 ELECTROCARDIOGRAM COMPLETE: CPT | Performed by: INTERNAL MEDICINE

## 2023-01-25 PROCEDURE — 99214 OFFICE O/P EST MOD 30 MIN: CPT | Performed by: INTERNAL MEDICINE

## 2023-01-25 NOTE — PROGRESS NOTES
"  [facility] Cardiology  OFFICE NOTE    Cardiovascular Medicine  Matti Barnard M.D., RPVI         No referring provider defined for this encounter.    Thank you for asking me to see Nikhil Hernadez for CAD.    This is a 62 y.o. male with:    1. Coronary artery disease involving coronary bypass graft of native heart without angina pectoris    2. PVD (peripheral vascular disease) with claudication (CMS/HCC)    3. Essential hypertension    4. Pure hypercholesterolemia    5. Nicotine abuse          Chief complaint -CAD status post CABG, PVD     History of present Illness- 62 y.o.-year-old gentleman was history of coronary disease status post two-vessel bypass in 1995 at [facility] in the setting of an acute MI , He smokes about a pack-a-day for many years, and he used to drink pretty heavy. He had total occlusion of the left SFA which was treated with \"atherectomy and drug-coated balloon by Dr. Acosta.  He is on Plavix and aspirin.     His main complaint was claudications in the left calf, he had ABIs done since last visit showed severely reduced, has been started on cilostazol with some improvement however he still having significant symptoms and need to stop frequently. Underewnt bilateral iliac stenting   Followed by left SFA  angioplasty with directional atherectomy in August 2022.    ABIs normalized on the left side.    Has been on aspirin/Plavix/cilostazol along with amlodipine and Lipitor.  He has some left hip pain.  Denies any claudications.  No bleeding problems.  The    01/25/2023:  Patient is here for preop restratification for shoulder surgery.  He is pretty active at baseline and reported can walk for several miles of any limitations from chest pain or shortness of breath.  Reported improving claudications.      Review of Systems - ROS  Constitution: Negative for weakness, weight gain and weight loss.   HENT: Negative for congestion.    Eyes: Negative for blurred vision. "   Cardiovascular: As mentioned above  Respiratory: Negative for cough and hemoptysis.    Endocrine: Negative for polydipsia and polyuria.   Hematologic/Lymphatic: Negative for bleeding problem. Does not bruise/bleed easily.   Skin: Negative for flushing.   Musculoskeletal: Negative for neck pain and stiffness.   Gastrointestinal: Negative for abdominal pain, diarrhea, jaundice, melena, nausea and vomiting.   Genitourinary: Negative for dysuria and hematuria.   Neurological: Negative for dizziness, focal weakness and numbness.   Psychiatric/Behavioral: Negative for altered mental status and depression.     I reviewed the ROS as documented here and confirmed the accuracy of it with the patient today. 1/25/2023        All other systems were reviewed and were negative.    family history includes Cancer in his father and mother.     reports that he has been smoking cigarettes. He has been smoking an average of .25 packs per day. He has never used smokeless tobacco. He reports current alcohol use of about 15.0 standard drinks per week. He reports that he does not use drugs.    Allergies   Allergen Reactions   • Aleve [Naproxen Sodium] Itching, Swelling and Rash   • Morphine And Related Nausea And Vomiting         Current Outpatient Medications:   •  amLODIPine (NORVASC) 5 MG tablet, Take 1 tablet by mouth Daily., Disp: 90 tablet, Rfl: 3  •  aspirin 81 MG chewable tablet, Chew 81 mg Daily., Disp: , Rfl:   •  atorvastatin (LIPITOR) 20 MG tablet, TAKE 1 TABLET EVERY NIGHT, Disp: 90 tablet, Rfl: 3  •  cetirizine (zyrTEC) 10 MG tablet, Take 10 mg by mouth As Needed., Disp: , Rfl:   •  cilostazol (PLETAL) 100 MG tablet, Take 1 tablet by mouth 2 (Two) Times a Day., Disp: 60 tablet, Rfl: 2  •  clopidogrel (PLAVIX) 75 MG tablet, Take 1 tablet by mouth Daily., Disp: 90 tablet, Rfl: 3  •  lisinopril (PRINIVIL,ZESTRIL) 20 MG tablet, TAKE 1 TABLET EVERY NIGHT, Disp: 90 tablet, Rfl: 3  •  TOPROL XL 50 MG 24 hr tablet, Take 50 mg by  "mouth Every Night., Disp: , Rfl:   •  sildenafil (Viagra) 25 MG tablet, Take 1 tablet by mouth Daily As Needed for Erectile Dysfunction., Disp: 30 tablet, Rfl: 0  •  varenicline (CHANTIX) 1 MG tablet, , Disp: , Rfl:     Physical Exam:  Vitals:    01/25/23 1009   BP: 158/84   BP Location: Left arm   Patient Position: Sitting   Cuff Size: Adult   Pulse: 59   SpO2: 98%   Weight: 92 kg (202 lb 14.4 oz)   Height: 170.2 cm (67\")   PainSc: 0-No pain   PainLoc: Chest     Current Pain Level: none  Pulse Ox: Normal  on room air  General: alert, appears stated age and cooperative     Body Habitus: well-nourished    HEENT: Head: Normocephalic, no lesions, without obvious abnormality. No arcus senilis, xanthelasma or xanthomas.    Neuro: alert, oriented x3  Pulses: 2+ and symmetric  JVP: Volume/Pulsation: Normal.  Normal waveforms.   Appropriate inspiratory decrease.  No Kussmaul's. No Laurel's.   Carotid Exam: no bruit normal pulsation bilaterally   Carotid Volume: normal.     Respirations: no increased work of breathing   Chest:  Normal    Pulmonary:Normal   Precordium: Normal impulses. P2 is not palpable.  RV Heave: absent  LV Heave: absent  [place]:  normal size and placement  Palpable S4: absent.  Heart rate: normal    Heart Rhythm: regular     Heart Sounds: S1: normal  S2: normal  S3: absent   S4: absent  Opening Snap: absent    Pericardial Rub:  Absent: .    Abdomen:   Appearance: normal .  Palpation: Soft, non-tender to palpation, bowel sounds positive in all four quadrants; no guarding or rebound tenderness  Extremity: no edema.   LE Skin: no rashes  LE Hair:  normal  LE Pulses: well perfused with normal pulses in the distal extremities  Pallor on elevation: Absent. Rubor on dependency: None    I have reexamined the patient and the results are consistent with the previously documented exam. Matti Barnard MD       DATA REVIEWED:     EKG. I personally reviewed and interpreted the EKG.  Normal sinus rhythm.  Nonspecific " ST-T changes.  Right bundle branch block.    ECG/EMG Results (all)     None        ---------------------------------------------------  TTE/ASHTYN:  Results for orders placed in visit on 12/13/21    Adult Transthoracic Echo Complete W/ Cont if Necessary Per Protocol    Interpretation Summary  · Left ventricular ejection fraction appears to be 56 - 60%. Left ventricular systolic function is normal.  · Left atrial volume is mildly increased.  · Left ventricular diastolic function was normal.        --------------------------------------------------------------------------------------------------  LABS:     The CVD Risk score (DEEPALI'Agostino, et al., 2008) failed to calculate for the following reasons:    The patient has a prior MI, stroke, CHF, or peripheral vascular disease diagnosis         Lab Results   Component Value Date    GLUCOSE 124 (H) 08/02/2022    BUN 6 (L) 08/02/2022    CREATININE 0.61 (L) 08/02/2022    EGFRIFNONA 142 02/03/2022    BCR 9.8 08/02/2022    K 3.5 08/02/2022    CO2 24.0 08/02/2022    CALCIUM 9.3 08/02/2022    ALBUMIN 3.90 09/11/2018    AST 52 09/11/2018    ALT 62 09/11/2018     Lab Results   Component Value Date    WBC 5.19 08/02/2022    HGB 16.0 08/02/2022    HCT 45.4 08/02/2022    MCV 87.8 08/02/2022     08/02/2022     Lab Results   Component Value Date    CHOL 102 10/03/2018    TRIG 59 10/03/2018    HDL 38 (L) 10/03/2018    LDL 42 10/03/2018     No results found for: TSH, G6JCCON, D9VNSKK, THYROIDAB  No results found for: CKTOTAL, CKMB, CKMBINDEX, TROPONINI, TROPONINT  No results found for: HGBA1C  No results found for: DDIMER  Lab Results   Component Value Date    ALT 62 09/11/2018     No results found for: HGBA1C  Lab Results   Component Value Date    CREATININE 0.61 (L) 08/02/2022     No results found for: IRON, TIBC, FERRITIN  Lab Results   Component Value Date    INR 1.11 08/02/2022    INR 1.02 02/02/2022    INR 1.06 01/26/2022    PROTIME 14.1 08/02/2022    PROTIME 13.3 02/02/2022     PROTIME 13.7 01/26/2022       Assessment/Plan     CAD status post CABG with 2 vessel bypass in 1995  with long-standing history of tobacco use and alcohol abuse.  Stress test showed  intermediate risk study with small to medium ischemia in the inferior wall in 2018.  However patient had been asymptomatic.  Has been treated with conservative therapy.  We will continue medical management for now.  If patient were to have symptoms of chest pain and will consider cardiac cath for further evaluation.  Continue aspirin Plavix.    Hyperlipidemia: LDL is less than 70 on Lipitor 20 mg.  Will need repeat blood work.    Peripheral vascular disease -status post PTA to the left SFA and now bilateral iliac stenting.  ABIs normalized.  Continue asa/plavix/cilostazole  He is working on stopping smoking.    Hypertension/hyperlipidemia on atorvastatin and lisinopril with Toprol-XL and amlodipine.   Blood pressure is elevated in office.  However he has significant fluctuation in blood pressure and wants to continue same doses of medications for now.    Erectile dysfunction: Counseled again on smoking cessation.  We will give a trial of Viagra until he sees his primary care physician.    Cardiac Risk Estimation*: per the Revised Cardiac Risk Index (Circ. 100:1043, 1999), the patient's risk factors for cardiac complications history of ischemic heart disease place the patient in: RCI RISK CLASS II (1 risk factor, risk of major cardiac compl. appr. 1.3%). The patient is currently Asymptomatic from a cardiovascular standpoint. The patient's current cardiovascular disease(s) are medically optimized. The patient is currently endorsing ability to achieve >4 METS.   Patient has known history of coronary artery disease with remote CABG, previous stress test in 2018 was intermediate risk however he was asymptomatic so treated conservatively.  Has probability of underlying stable coronary artery disease but currently asymptomatic.  He will be at  "moderate risk for major cardiovascular events with his shoulder surgery.    Pre-Op Evaluation Plan  1. Preoperative workup as follows none.  2. Change in medication regimen before surgery: Can stop Plavix and Pletal 5 days before surgery..  3. Prophylaxis for cardiac events with perioperative beta-blockers for > three RCRI risks: On metoprolol XL..  4. Deep vein thrombosis prophylaxis: Indicated, per surgeon.  5. Intra-op body temperature: Normothermia recommended in noncardiac surgery.    *Cardiovascular Risk Estimates provided by the RCRI are provisional and no guarantee of outcome. \"Surgical clearance\" is not provided. Only  risk assessment and management options are performed.  The final decision for operative intervention is at the discretion of the operating physician. This office follows the 2014 ACCF guidelines for preoperative risk evaluation.        Prevention:  Patient's Body mass index is 31.78 kg/m². BMI is above normal parameters. Recommendations include: exercise counseling and nutrition counseling.             AAA Screening:   Check after he turned 63.            This document has been electronically signed by Matti Barnard MD on January 25, 2023 10:13 CST              "

## 2023-02-02 LAB
QT INTERVAL: 458 MS
QTC INTERVAL: 453 MS

## 2025-03-19 NOTE — THERAPY EVALUATION
"    Outpatient Physical Therapy Ortho Initial Evaluation  [facility]  Niharika Louie, PT, DPT, CSCS       Patient Name: Nikhil Hernadez  : 1960  MRN: [id number]  Today's Date: 2018      Visit Date: 2018     Pt reports 8/10 pain pre treatment, \"some\"/10 pain post treatment  Reports N/A% of improvement.  Attended  visits.  Insurance available: 60 visits  Next MD appt: 12/10/2018.  Recertification: N/A.    Patient Active Problem List   Diagnosis   • Essential hypertension   • Pure hypercholesterolemia   • Nicotine abuse   • Pre-operative clearance   • ETOH abuse   • PVD (peripheral vascular disease) with claudication (CMS/HCC)        Past Medical History:   Diagnosis Date   • Gallstones    • Hyperlipidemia    • Hypertension    • Myocardial infarction (CMS/HCC)         Past Surgical History:   Procedure Laterality Date   • ARTERIAL BYPASS SURGERY     • CORONARY ARTERY BYPASS GRAFT     • TOTAL HIP ARTHROPLASTY Right 2018       Visit Dx:     ICD-10-CM ICD-9-CM   1. Status post total replacement of right hip Z96.641 V43.64     Number of days off work: Off since surgery    Patient is .    Allergies       Aleve [Naproxen Sodium]Itching, Swelling, Rash   Morphine And RelatedNausea And Vomiting     Narciso as Reviewed Reviewed by You at 1:47 PM CST     Medications (Admitted on 2018)     amLODIPine (NORVASC) 5 MG tablet     aspirin 81 MG chewable tablet     atorvastatin (LIPITOR) 20 MG tablet     cetirizine (zyrTEC) 10 MG tablet     clopidogrel (PLAVIX) 75 MG tablet     lisinopril (PRINIVIL,ZESTRIL) 20 MG tablet     TOPROL XL 50 MG 24 hr tablet        Patient History     Row Name 18 1300             History    Chief Complaint  Pain;Joint swelling;Joint stiffness  -AJ      Type of Pain  Hip pain  -AJ      Date Current Problem(s) Began  18 Chronic, recent replacement  -AJ      Brief Description of Current Complaint  Patient reports that his hip " Physical Therapy    Visit Type: treatment  SUBJECTIVE  Patient sitting up in recliner and requesting to go back to bed after toileting.     Pain     Location: left leg     OBJECTIVE          Bed Mobility  - Sit to supine: modified independent  Transfers  Assistive devices: gait belt, 2-wheeled walker  - Sit to stand: minimal assist, with verbal cues  - Stand to sit: with verbal cues  - Toilet: stand by assist  Patient requesting no assistance to try and stand from recliner chair with her platform WW.  Patient attempting unsuccessfully several trials and then allowed assistance to stand.  Patient only given verbal/instruction cues for sit-stand from toilet.     Ambulation / Gait  - Assistive device: platform attachment, 2-wheeled walker and gait belt  - Distance (feet unless otherwise indicated): 25  in and out of bathroom  - Assist Level: stand by assist  - Surface: even  - Description: forward flexed at hips, heavy reliance on BUE, step to and decreased tramaine/pace    Patient with severely stooped posture over platform WW which she did correct at times with verbal/instruction cues' for postural awareness but returned to same posture of leaning over right handgrip with forearm.     Interventions     Training provided: safety training, gait training, transfer training and activity tolerance  Patient returned to bed with food tray delivered and spouse in room.   Skilled input: Verbal instruction/cues and posture correction  Verbal Consent: Writer verbally educated and received verbal consent for hand placement, positioning of patient, and techniques to be performed today from patient for clothing adjustments for techniques and therapist position for techniques as described above and how they are pertinent to the patient's plan of care.         ASSESSMENT   Progress: progressing toward goals  Interfering components: decreased activity tolerance    Discharge needs based on today's assessment:   - Current level of  function: slightly below baseline level of function   - Therapy needs at discharge: therapy 1-3 times per week   - Activities of daily living (ADLs) requiring support at discharge: transfers, ambulation and bed mobility   - Impairments that require further therapy intervention: activity tolerance, pain and safety awareness    AM-PAC  - Generalized Prior Level of Function: Needs a little help ([facility] 12-21)       Key: MOD A=moderate assistance, IND/MOD I=independent/modified independent  - Generalized Current Level of Function     - Current Mobility Score: 16       AM-PAC Scoring Key= >21 Modified Independent; 20-21 Supervision; 18-19 Minimal assist; 13-17 Moderate assist; 9-12 Max assist; <9 Total assist      Pain at End of Session: RN informed on pain level  Pain: 6/10, location: left leg    PLAN (while hospitalized)  Suggestions for next session as indicated: Reassess supine to sit  PT Frequency: 3-5 x per week      PT/OT Mobility Equipment for Discharge: use owned platform WW  PT/OT ADL Equipment for Discharge: Declined equipment recommendations        GOALS  Long Term Goals: (to be met by time of discharge from hospital)  Sit to supine: Patient will complete sit to supine minimal assist.  Supine to sit: Patient will complete supine to sit minimal assist.  Status: met   Sit to stand: Patient will complete sit to stand transfer with gait belt and platform walker, minimal assist.   Status: met   Stand to sit: Patient will complete stand to sit transfer with gait belt and platform walker, contact guard or touching/steadying.   Status: met   Ambulation (even): Patient will ambulate on even surface for 20 feet with gait belt and platform walker, minimal assist.   Status: met   Documented in the chart in the following areas: Prior Function. Assessment/Plan.      Patient at End of Session:   Location: in bed  Safety measures: alarm system in place/re-engaged  Handoff to: nurse      Therapy procedure time and total  has been bothering him for some time. He reports that it just kept getting worse and worse. He reports that he had the replacement on monday and went home on tuesday.  -AJ      Previous treatment for THIS PROBLEM  Medication;Injections  -AJ      Patient/Caregiver Goals  Relieve pain;Return to prior level of function;Return to work  -AJ      Current Tobacco Use  ~1ppd  -AJ      Smoking Status  Daily smoker  -AJ      Patient's Rating of General Health  Fair  -AJ      Occupation/sports/leisure activities  Occupation: ; Hobbies: play golf, deer hunting  -AJ      Patient seeing anyone else for problem(s)?  Yes, Ortho  -AJ      What clinical tests have you had for this problem?  X-ray  -AJ      Results of Clinical Tests  need a hip replacement, replacement looked good.  -AJ      Related/Recent Hospitalizations  Yes  -AJ      Date of Hospitalization  12/03/18  -AJ      Surgery/Hospitalization  R [facility]  -AJ      History of Previous Related Injuries  Nothing significant.  -AJ         Pain     Pain Location  Hip  -AJ      Pain at Present  8  -AJ      Pain at Best  6  -AJ      Pain at Worst  10  -AJ      Pain Frequency  Constant/continuous  -AJ      Pain Description  Sharp;Shooting  -AJ      What Performance Factors Make the Current Problem(s) WORSE?  getitng up from a surface with no arms.  -AJ      What Performance Factors Make the Current Problem(s) BETTER?  Ice  -AJ      Is your sleep disturbed?  Yes  -AJ      Is medication used to assist with sleep?  Yes  -AJ      What position do you sleep in?  Left sidelying;Right sidelying  -AJ      Difficulties at work?  Off work since surgery  -AJ      Difficulties with ADL's?  dressing, grooming, dressing  -AJ      Difficulties with recreational activities?  all since surgery.  -AJ        User Key  (r) = Recorded By, (t) = Taken By, (c) = Cosigned By    Initials Name Provider Type    Niharika King, PT Physical Therapist          PT Ortho     Row Name 12/06/18  "1300       Subjective Comments    Subjective Comments  Patient wishes to be able to go back to work without so much pain.  -AJ       Precautions and Contraindications    Precautions/Limitations  hip precautions- right  -AJ       Subjective Pain    Able to rate subjective pain?  yes  -AJ    Pre-Treatment Pain Level  8  -AJ    Post-Treatment Pain Level  -- \"some\"  -AJ       Posture/Observations    Posture- WNL  Posture is WNL for B LEs  -AJ    Observations  Ecchymosis/bruising;Edema;Incision healing Incision covered with dressing.  -AJ    Posture/Observations Comments  No acute dustress, ambulating with RW.  -AJ       Special Tests/Palpation    Special Tests/Palpation  -- Mod global TTP  -AJ       General ROM    GENERAL ROM COMMENTS  AROM all WHP, except flexion ~30° actively.  -AJ       MMT (Manual Muscle Testing)    General MMT Comments  R hip abd 4-/5, flexion 4-/5, add 4+/5, ext 4-/5; QS/HA 4/5, DF/PF 5/5; L LE 5/5.  -AJ       Sensation    Sensation WNL?  WNL  -AJ    Light Touch  No apparent deficits  -AJ    Additional Comments  Denies any numbness or tinging  -AJ       Lower Extremity Flexibility    Hamstrings  Bilateral:;Mildly limited;Moderately limited  -AJ    Hip Flexors  Right:;Moderately limited;Left:;Mildly limited  -AJ       Transfers    Comment (Transfers)  I with all transfers, unweights R LE with sit to/from stand  -AJ       Gait/Stairs Assessment/Training    Gait/Stairs Assessment/Training  gait/ambulation assistive device  -    Cameron Level (Gait)  conditional independence  -AJ    Assistive Device (Gait)  walker, front-wheeled  -AJ    Pattern (Gait)  step-to  -AJ    Deviations/Abnormal Patterns (Gait)  right sided deviations;antalgic  -AJ    Right Sided Gait Deviations  heel strike decreased toe walks  -AJ      User Key  (r) = Recorded By, (t) = Taken By, (c) = Cosigned By    Initials Name Provider Type    AJ Niharika Louie, PT Physical Therapist            Therapy Education  Given: " treatment time can be found documented on the Time Entry flowsheet   HEP, Symptoms/condition management, Pain management, Mobility training, Edema management, Bandaging/dressing change, Fall prevention and home safety  Program: New  How Provided: Verbal, Demonstration, Written  Provided to: Patient  Level of Understanding: Verbalized, Demonstrated     PT OP Goals     Row Name 12/06/18 1500          PT Short Term Goals    STG Date to Achieve  12/27/18  -     STG 1  I with HEP and have additions/changes by next recertification.  -     STG 2  Patient able ot verbalize all 3 hip precautins with no cueing.  -     STG 3  Patient able ot ambualte with more heel to toe gait with RW >= 150'.  -     STG 4  Patient able to perform standing alternating marching with R hip flexion >= 80°.  -     STG 5  Patient able to eprform 20 SLR, no lag present.  -        Long Term Goals    LTG Date to Achieve  01/18/19  -     LTG 1  AROm ro R hip all WFL, ext >= 10°.  -     LTG 2  B LE 5/5  -     LTG 3  Patient able ot perform sit to/from stand x20 with 1 UE A.  -     LTG 4  Patient able to ambulate up/down 3 steps reciprocally x5 reps HRA for balance.  -     LTG 5  Patient able to ambulate >= 600' non-antalgically no increas ein pain.  -     LTG 6  I with final HEP.  -     LTG 7  D/C with a final HEp and free 30 day fitness formula memembership.  -        Time Calculation    PT Goal Re-Cert Due Date  12/27/18  -       User Key  (r) = Recorded By, (t) = Taken By, (c) = Cosigned By    Initials Name Provider Type    Niharika King, PT Physical Therapist         Barriers to Rehab: Include significant or possible arthritic/degenerative changes that have occurred within the joint, The patient's obesity.    Safety Issues: None noted.      PT Assessment/Plan     Row Name 12/06/18 1500          PT Assessment    Functional Limitations  Impaired gait;Impaired locomotion;Decreased safety during functional activities;Limitation in home management;Limitations in community  activities;Performance in leisure activities;Performance in self-care ADL;Performance in work activities  -     Impairments  Balance;Edema;Endurance;Gait;Impaired flexibility;Impaired muscle endurance;Impaired muscle length;Impaired muscle power;Range of motion;Posture;Pain;Muscle strength;Locomotion;Joint mobility  -     Rehab Potential  Good  -AJ     Patient/caregiver participated in establishment of treatment plan and goals  Yes  -AJ     Patient would benefit from skilled therapy intervention  Yes  -AJ        PT Plan    PT Frequency  2x/week  -AJ     Predicted Duration of Therapy Intervention (Therapy Eval)  4-6 weeks, 8-12 visits  -     Planned CPT's?  PT EVAL MOD COMPLELITY: 04226;PT RE-EVAL: 18911;PT THER PROC EA 15 MIN: 94467;PT THER ACT EA 15 MIN: 45852;PT MANUAL THERAPY EA 15 MIN: 40145;PT ELECTRICAL STIM UNATTEND: ;PT THER SUPP EA 15 MIN  -     Physical Therapy Interventions (Optional Details)  balance training;gait training;gross motor skills;home exercise program;manual therapy techniques;modalities;patient/family education;postural re-education;ROM (Range of Motion);stair training;strengthening;stretching  -     PT Plan Comments  Progress overall strength, ROM, gait per MD order.  -       User Key  (r) = Recorded By, (t) = Taken By, (c) = Cosigned By    Initials Name Provider Type    Niharika King, PT Physical Therapist       Other therapeutic activities and/or exercises will be prescribed depending on the patients progress or lack there of.    Modalities     Row Name 12/06/18 1300             Ice    Ice Applied  Yes  -      Location  R hip  -AJ      Rx Minutes  -- 20 minutes  -AJ      Ice S/P Rx  Yes  -        User Key  (r) = Recorded By, (t) = Taken By, (c) = Cosigned By    Initials Name Provider Type    Niharika King, PT Physical Therapist        Exercises     Row Name 12/06/18 1300             Subjective Comments    Subjective Comments  Patient wishes to be  "able to go back to work without so much pain.  -AJ         Subjective Pain    Able to rate subjective pain?  yes  -AJ      Pre-Treatment Pain Level  8  -AJ      Post-Treatment Pain Level  -- \"some\"  -AJ         Exercise 1    Exercise Name 1  Pro II LE  -AJ      Time 1  8 minutes  -AJ      Additional Comments  L 2.0  -AJ         Exercise 2    Exercise Name 2  Sit to/from stand  -AJ      Sets 2  2  -AJ      Reps 2  10  -AJ         Exercise 3    Exercise Name 3  HLM  -AJ      Reps 3  10 each LE  -AJ      Time 3  3\" hold  -AJ         Exercise 4    Exercise Name 4  Supine SAQ  -AJ      Sets 4  2  -AJ      Reps 4  10  -AJ      Time 4  5\" hold  -AJ         Exercise 5    Exercise Name 5  Bridges  -AJ      Sets 5  1  -AJ      Reps 5  10  -AJ      Time 5  5\" hold  -AJ        User Key  (r) = Recorded By, (t) = Taken By, (c) = Cosigned By    Initials Name Provider Type    Niharika King, PT Physical Therapist                        Outcome Measure Options: Lower Extremity Functional Scale (LEFS)  Lower Extremity Functional Index  Any of your usual work, housework or school activities: Extreme difficulty or unable to perform activity  Your usual hobbies, recreational or sporting activities: Moderate difficulty  Getting into or out of the bath: Extreme difficulty or unable to perform activity  Walking between rooms: Moderate difficulty  Putting on your shoes or socks: Extreme difficulty or unable to perform activity  Squatting: Extreme difficulty or unable to perform activity  Lifting an object, like a bag of groceries from the floor: Extreme difficulty or unable to perform activity  Performing light activities around your home: Extreme difficulty or unable to perform activity  Performing heavy activities around your home: Extreme difficulty or unable to perform activity  Getting into or out of a car: Moderate difficulty  Walking 2 blocks: Extreme difficulty or unable to perform activity  Walking a mile: Extreme " difficulty or unable to perform activity  Going up or down 10 stairs (about 1 flight of stairs): Extreme difficulty or unable to perform activity  Standing for 1 hour: Extreme difficulty or unable to perform activity  Sitting for 1 hour: Quite a bit of difficulty  Running on even ground: Extreme difficulty or unable to perform activity  Running on uneven ground: Extreme difficulty or unable to perform activity  Making sharp turns while running fast: Extreme difficulty or unable to perform activity  Hopping: Extreme difficulty or unable to perform activity  Rolling over in bed: Quite a bit of difficulty  Total: 8      Time Calculation:   Start Time: 1340  Stop Time: 1450  Time Calculation (min): 70 min  PT Non-Billable Time (min): 20 min  Total Timed Code Minutes- PT: 25 minute(s)     Therapy Charges for Today     Code Description Service Date Service Provider Modifiers Qty    20151450446 HC PT EVAL MOD COMPLEXITY 2 12/6/2018 Niharika Louie, PT GP 1    26082364716 HC PT THER PROC EA 15 MIN 12/6/2018 Niharika Louie, PT GP 2    78813364259 HC PT THER SUPP EA 15 MIN 12/6/2018 Niharika Louie, PT GP 1          PT G-Codes  Outcome Measure Options: Lower Extremity Functional Scale (LEFS)  Total: 8         Niharika Louie PT, DPT, CSCS  12/6/2018

## (undated) DEVICE — DEV EPS SPIDERFX 6MM OTW320/RX190CM

## (undated) DEVICE — CATH SUP PROC TRAILBLAZER .035IN 135CM

## (undated) DEVICE — PINNACLE INTRODUCER SHEATH: Brand: PINNACLE

## (undated) DEVICE — Device

## (undated) DEVICE — HI-TORQUE COMMAND ES GUIDE WIRE .014" 300 CM: Brand: HI-TORQUE COMMAND

## (undated) DEVICE — PAD HEMOST NEPTUNE 2X2IN

## (undated) DEVICE — ARMADA 35 PTA CATHETER 5 MM X 100 MM X 135 CM / OVER-THE-WIRE: Brand: ARMADA

## (undated) DEVICE — DEV INFL MONARCH 20ML

## (undated) DEVICE — RADIFOCUS GLIDECATH: Brand: GLIDECATH

## (undated) DEVICE — ELECTRODE,RT,STRESS,FOAM,50PK: Brand: MEDLINE

## (undated) DEVICE — GW PERIPH GUIDERIGHT STD/J/TP PTFE/PCOAT SS 0.038IN 5X150CM

## (undated) DEVICE — MODEL AT P65, P/N 701554-001KIT CONTENTS: HAND CONTROLLER, 3-WAY HIGH-PRESSURE STOPCOCK WITH ROTATING END AND PREMIUM HIGH-PRESSURE TUBING: Brand: ANGIOTOUCH® KIT

## (undated) DEVICE — RADIFOCUS GLIDEWIRE ADVANTAGE GUIDEWIRE: Brand: GLIDEWIRE ADVANTAGE

## (undated) DEVICE — PK CATH LAB 60

## (undated) DEVICE — CATH GUIDE SOFTVU SELECT/V HT OMNI .038 5F 65CM

## (undated) DEVICE — HI-TORQUE SUPRA CORE .035 PERIPHERAL GUIDE WIRE .035 X 300 CM: Brand: HI-TORQUE SUPRA CORE

## (undated) DEVICE — Device: Brand: CORSAIR

## (undated) DEVICE — DESTINATION PERIPHERAL GUIDING SHEATH: Brand: DESTINATION

## (undated) DEVICE — ARMADA 35 PTA CATHETER 4 MM X 40 MM X 135 CM / OVER-THE-WIRE: Brand: ARMADA

## (undated) DEVICE — QUICK-CROSS™ SUPPORT CATHETER: Brand: QUICK-CROSS™

## (undated) DEVICE — INTRO SHEATH ART/FEM ENGAGE .038 6F12CM

## (undated) DEVICE — KT INTRO MINISTICK MAX W/GW NITNL/TUNG ECHO 4F 21G 7CM

## (undated) DEVICE — ST CVR PROB PULLUP ULTRASND 5X48IN

## (undated) DEVICE — PERCLOSE™ PROSTYLE™ SUTURE-MEDIATED CLOSURE AND REPAIR SYSTEM: Brand: PERCLOSE™ PROSTYLE™

## (undated) DEVICE — RADIFOCUS TORQUE DEVICE MULTI-TORQUE VISE: Brand: RADIFOCUS TORQUE DEVICE

## (undated) DEVICE — A2000 MULTI-USE SYRINGE KIT, P/N 701277-003KIT CONTENTS: 100ML CONTRAST RESERVOIR AND TUBING WITH CONTRAST SPIKE AND CLAMP: Brand: A2000 MULTI-USE SYRINGE KIT

## (undated) DEVICE — DRVR HAWK1 CUT

## (undated) DEVICE — Device: Brand: FIELDER XT

## (undated) DEVICE — COPILOT KIT INCLUDES BLEEDBACK CONTROL VALVE / GUIDE WIRE INTRODUCER / TORQUE DEVICE: Brand: ACCESSORIES

## (undated) DEVICE — ARMADA 35 PTA CATHETER 4 MM X 20 MM X 135 CM / OVER-THE-WIRE: Brand: ARMADA

## (undated) DEVICE — VIPERWIRE, ADVANCE FLEXTIP PERIPHERAL, .014" DIA SPRING TIP, 335CM, 5 PACK: Brand: VIPERWIRE ADVANCE FLEXTIP

## (undated) DEVICE — VIPERSLIDE, 100ML BAG, 10 PK: Brand: VIPERSLIDE

## (undated) DEVICE — HI-TORQUE VERSACORE FLOPPY GUIDE WIRE SYSTEM 260 CM: Brand: HI-TORQUE VERSACORE

## (undated) DEVICE — MODEL BT2000 P/N 700287-012KIT CONTENTS: MANIFOLD WITH SALINE AND CONTRAST PORTS, SALINE TUBING WITH SPIKE AND HAND SYRINGE, TRANSDUCER: Brand: BT2000 AUTOMATED MANIFOLD KIT

## (undated) DEVICE — CATH DIAG EXPO .052 PIG 6F 110CM

## (undated) DEVICE — CATH LITHOPLASTY M5 7F 7X60MM

## (undated) DEVICE — GW PERIPH GUIDERIGHT STD/J/TP PTFE/PCOAT SS .035IN 3MM 150CM

## (undated) DEVICE — AMPLATZ EXTRA STIFF WIRE GUIDE: Brand: AMPLATZ

## (undated) DEVICE — Device: Brand: ASTATO XS 20

## (undated) DEVICE — DIAMONDBACK PERIPHERAL, SOLID CROWN, 1.50MM, 145CM SHAFT: Brand: DIAMONDBACK PERIPHERAL

## (undated) DEVICE — MYNXGRIP 6F/7F: Brand: MYNXGRIP

## (undated) DEVICE — PERCLOSE PROGLIDE™ SUTURE-MEDIATED CLOSURE SYSTEM: Brand: PERCLOSE PROGLIDE™

## (undated) DEVICE — CATH DIAG EXPO .056 IM 6F 100CM

## (undated) DEVICE — Device: Brand: TREASURE 12

## (undated) DEVICE — CATH ATHRCTMY HAWK1 6F